# Patient Record
Sex: FEMALE | Race: BLACK OR AFRICAN AMERICAN | Employment: FULL TIME | URBAN - METROPOLITAN AREA
[De-identification: names, ages, dates, MRNs, and addresses within clinical notes are randomized per-mention and may not be internally consistent; named-entity substitution may affect disease eponyms.]

---

## 2017-03-29 ENCOUNTER — HOSPITAL ENCOUNTER (OUTPATIENT)
Dept: SURGERY | Age: 52
Discharge: HOME | End: 2017-03-29

## 2017-03-29 VITALS
BODY MASS INDEX: 27.93 KG/M2 | RESPIRATION RATE: 18 BRPM | HEART RATE: 57 BPM | WEIGHT: 195.06 LBS | DIASTOLIC BLOOD PRESSURE: 82 MMHG | TEMPERATURE: 97.9 F | OXYGEN SATURATION: 98 % | HEIGHT: 70 IN | SYSTOLIC BLOOD PRESSURE: 120 MMHG

## 2017-03-29 NOTE — PERIOP NOTES
Patient verified name, , and surgery as listed in [facility]. TYPE  CASE:1B  Orders per surgeon: not Received, call placed to office  Labs per surgeon:none  Labs per anesthesia protocol: none   EKG  :  Not needed at time of PAT      Patient provided with handouts including guide to surgery , transfusions, pain management and hand hygiene for the family and community. Pt verbalizes understanding of all pre-op instructions . Instructed that family must be present in building at all times. Hibiclens and instructions given per hospital policy. Instructed patient to continue  previous medications as prescribed prior to surgery and  to take the following medications the day of surgery according to anesthesia guidelines : singular, pantoprazole, bisoprolol       Original medication prescription bottles not visualized during patient appointment. Continue all previous medications unless otherwise directed. Instructed patient to hold  the following medications prior to surgery: Biotin, Vitamin D      Patient verbalized understanding of all instructions and provided all medical/health information to the best of their ability.

## 2017-04-01 ENCOUNTER — ANESTHESIA EVENT (OUTPATIENT)
Dept: SURGERY | Age: 52
End: 2017-04-01
Payer: COMMERCIAL

## 2017-04-02 NOTE — H&P
Loren Ward  History and physical    Subjective  Problem List:.   1. Right knee pain  2. Ganglion cyst in wrist.   3. Left wrist pain. 4. Left Sternoclavicular separation. 5. left shoulder pain     This right hand dominant patient presents today for evaluation of left shoulder pain. She complains of continued severe diffuse pain in her left shoulder. She states the cortisone injections she received on 15 were not effective. She presents today ambulatory without assistance and is unaccompanied. Known allergies: NKDA. Past Medical History (Major events, hospitalizations, surgeries):  [id number],  rt. ganglion cyst removal-, , hysterectomy-. Ongoing medical problems: cervical cancer, hypertension, allergies. Family medical history: cancer-grandparent, diabetes-grandparent, hypertension-grandparent. Social history: Patient denies tobacco and ETOH use. Patient is  and full time employed. Review of systems:   General: Denies weight change, generally healthy,  change in strength or exercise tolerance. Head: Denies headaches,  vertigo,  injury. Eyes: Denies changes in vision, denies diplopia,  tearing, scotomata,  pain. Prescription glasses. .   Ears:  Denies change in hearing,  tinnitus, bleeding, vertigo. Nose: Denies epistaxis,  coryza, obstruction,  discharge. Mouth: Denies dental difficulties,  gingival bleeding,  use of dentures. Neck: Denies stiffness and pain. Chest: Denies dyspnea, wheezing, hemoptysis, cough. Heart: Denies chest pains,  palpitations, syncope,  orthopnea. Abdomen: Denies change in appetite,  dysphagia, abdominal pains, bowel habit changes, emesis, melena. : Denies urinary urgency,  dysuria, change in nature of urine. Neurologic: Denies weakness, denies tremor,  seizures, changes in mentation,  ataxia. Psychiatric: Denies depressive symptoms, changes in sleep habits,  changes in thought content.        Objective  Vital Signs: Height 69 inches; Weight 194 lbs; /81 mmHg; Temp 97.7 F; Pulse 80 bpm; Oxygen Saturation 97 %. .     Patient is a 46year old female who appears her given age and is in no apparent distress. Oriented to person, place, and time. Mood and affect are appropriate for age and situation. Assessment of respiratory effort reveals even and nonlabored respirations. She exhibits a nonantalgic, reciprocal gait. She is able to get onto and off of the examination table. GEN: NAD. Gavi Ruggiero Lungs clear to auscultation bilaterally. Heart rate regular without murmur heard. .     Left Shoulder Examination:. Inspection reveals no external signs of acute injury or trauma over the left shoulder. There is a prominence noted to the left SC joint. She has pain to palpation over her AC joint. No warmth or erythema noted. Palpation reveals diffuse tenderness over the left shoulder - with focal tenderness over the SC joint and over the lateral subacromial aspect of the shoulder. Shoulder motion is limited today secondary to pain. At 90 degrees of FF and Abduction she has marked difficulty going past this point with NOAM<. She has full PROM. Forced Adduction of the shoulder causes discrete pain at the [facility] joint. Elbow motion is normal.   Wrist motion is normal.   Finger motion is normal.   Muscle strength is normal, without evidence of atrophy noted. Positive impingement test.   Neurologic: Sensation is intact and symmetrical in all dermatomes upper extremities. .   Vascular: Peripheral pulses normal 2/2 upper extremities. Gavi Ruggiero Upper extremity deep tendon reflexes are normal..   Coordination is good. .     Assessment  Shoulder pain (719.41).      DIAGNOSIS:        Pain in left shoulder [ICD-10: M25.512], [ICD-9: 719.41], [SNOMED: 908426285]        Other injury of tendons of the rotator cuff of left shoulder, subsequent encounter [ICD-10: S46.092D], [ICD-9: V58.89], [SNOMED: 861413047]        Subluxation of left sternoclavicular joint, sequela [ICD-10: S43.202S], [ICD-9: 905.6], [SNOMED: 072557820]        Impingement syndrome of left shoulder [ICD-10: M75.42], [ICD-9: 726.2], [SNOMED: 810387568]  Plan  We discussed the pathophysiology of the diagnosis and options for treatment. Discussed with patient the surgical risks. We have talked about the possibility of complications of surgical procedure (left shoulder arthroscopy with acromioplasty, chayito procedure and possible RCR), including the possibility of damage to nerves, arteries, vessels and tendons, bleeding, infection, the possibility that the patient may sustain medical problems, even death. We have talked about the possibility that the condition may not improve after surgery or that it could actually be worse. Patient seems to understand and accept these possible complications. .     All questions answered at this time. Patient knows to contact the office with any questions or concerns. .     VERIFICATION OF ANCILLARY DOCUMENTATION:  The portions of the chart completed by ancillary personnel were reviewed by the physician. Mayi Andrew RTC :  post-op.   TM  L4    MEDICATIONS:        Ziac 5 mg-6.25 mg oral tablet 1 PO BID for hypertension                    prescription:   not prescribed this visit        Singulair 10 mg oral tablet 1 PO qd for asthma                    prescription:   not prescribed this visit        Lasix 20 mg oral tablet 1 PO qd for hypertension                    prescription:   not prescribed this visit        Vitamin D3 43053 UNIT Oral Tablet once weekly                    prescription:   not prescribed this visit        Protonix 40 MG Oral Tablet Delayed Release 1 tablet (40 mg) orally daily                    prescription:   not prescribed this visit

## 2017-04-03 RX ORDER — SODIUM CHLORIDE 0.9 % (FLUSH) 0.9 %
5-10 SYRINGE (ML) INJECTION AS NEEDED
Status: CANCELLED | OUTPATIENT
Start: 2017-04-03

## 2017-04-03 RX ORDER — OXYCODONE AND ACETAMINOPHEN 5; 325 MG/1; MG/1
1 TABLET ORAL AS NEEDED
Status: CANCELLED | OUTPATIENT
Start: 2017-04-03

## 2017-04-03 RX ORDER — SODIUM CHLORIDE, SODIUM LACTATE, POTASSIUM CHLORIDE, CALCIUM CHLORIDE 600; 310; 30; 20 MG/100ML; MG/100ML; MG/100ML; MG/100ML
75 INJECTION, SOLUTION INTRAVENOUS CONTINUOUS
Status: CANCELLED | OUTPATIENT
Start: 2017-04-03

## 2017-04-03 RX ORDER — NALOXONE HYDROCHLORIDE 0.4 MG/ML
0.2 INJECTION, SOLUTION INTRAMUSCULAR; INTRAVENOUS; SUBCUTANEOUS AS NEEDED
Status: CANCELLED | OUTPATIENT
Start: 2017-04-03

## 2017-04-03 RX ORDER — HYDROMORPHONE HYDROCHLORIDE 2 MG/ML
0.5 INJECTION, SOLUTION INTRAMUSCULAR; INTRAVENOUS; SUBCUTANEOUS
Status: CANCELLED | OUTPATIENT
Start: 2017-04-03

## 2017-04-04 ENCOUNTER — HOSPITAL ENCOUNTER (OUTPATIENT)
Age: 52
Setting detail: OUTPATIENT SURGERY
Discharge: HOME | End: 2017-04-04
Attending: ORTHOPAEDIC SURGERY | Admitting: ORTHOPAEDIC SURGERY
Payer: COMMERCIAL

## 2017-04-04 ENCOUNTER — ANESTHESIA (OUTPATIENT)
Dept: SURGERY | Age: 52
End: 2017-04-04
Payer: COMMERCIAL

## 2017-04-04 VITALS
OXYGEN SATURATION: 97 % | DIASTOLIC BLOOD PRESSURE: 93 MMHG | WEIGHT: 194.13 LBS | BODY MASS INDEX: 27.85 KG/M2 | HEART RATE: 60 BPM | TEMPERATURE: 97.8 F | SYSTOLIC BLOOD PRESSURE: 140 MMHG | RESPIRATION RATE: 16 BRPM

## 2017-04-04 LAB — POTASSIUM BLD-SCNC: 4 MMOL/L (ref 3.5–5.1)

## 2017-04-04 PROCEDURE — 77030002982 HC SUT POLYSRB J&J -A: Performed by: ORTHOPAEDIC SURGERY

## 2017-04-04 PROCEDURE — 77030002933 HC SUT MCRYL J&J -A: Performed by: ORTHOPAEDIC SURGERY

## 2017-04-04 PROCEDURE — 76942 ECHO GUIDE FOR BIOPSY: CPT | Performed by: ORTHOPAEDIC SURGERY

## 2017-04-04 PROCEDURE — 84132 ASSAY OF SERUM POTASSIUM: CPT

## 2017-04-04 PROCEDURE — 76010010054 HC POST OP PAIN BLOCK: Performed by: ORTHOPAEDIC SURGERY

## 2017-04-04 PROCEDURE — 76210000021 HC REC RM PH II 0.5 TO 1 HR: Performed by: ORTHOPAEDIC SURGERY

## 2017-04-04 PROCEDURE — 77030018836 HC SOL IRR NACL ICUM -A: Performed by: ORTHOPAEDIC SURGERY

## 2017-04-04 PROCEDURE — 76210000063 HC OR PH I REC FIRST 0.5 HR: Performed by: ORTHOPAEDIC SURGERY

## 2017-04-04 PROCEDURE — 74011250636 HC RX REV CODE- 250/636: Performed by: ORTHOPAEDIC SURGERY

## 2017-04-04 PROCEDURE — 77030008467 HC STPLR SKN COVD -B: Performed by: ORTHOPAEDIC SURGERY

## 2017-04-04 PROCEDURE — 76060000034 HC ANESTHESIA 1.5 TO 2 HR: Performed by: ORTHOPAEDIC SURGERY

## 2017-04-04 PROCEDURE — 77030032490 HC SLV COMPR SCD KNE COVD -B: Performed by: ORTHOPAEDIC SURGERY

## 2017-04-04 PROCEDURE — 77030006891 HC BLD SHV RESECT STRY -B: Performed by: ORTHOPAEDIC SURGERY

## 2017-04-04 PROCEDURE — 77030002916 HC SUT ETHLN J&J -A: Performed by: ORTHOPAEDIC SURGERY

## 2017-04-04 PROCEDURE — 77030019940 HC BLNKT HYPOTHRM STRY -B: Performed by: ANESTHESIOLOGY

## 2017-04-04 PROCEDURE — 77030019605: Performed by: ORTHOPAEDIC SURGERY

## 2017-04-04 PROCEDURE — 74011250636 HC RX REV CODE- 250/636: Performed by: ANESTHESIOLOGY

## 2017-04-04 PROCEDURE — A4565 SLINGS: HCPCS | Performed by: ORTHOPAEDIC SURGERY

## 2017-04-04 PROCEDURE — 77030004453 HC BUR SHV STRY -B: Performed by: ORTHOPAEDIC SURGERY

## 2017-04-04 PROCEDURE — 77030010507 HC ADH SKN DERMBND J&J -B: Performed by: ORTHOPAEDIC SURGERY

## 2017-04-04 PROCEDURE — 77030031139 HC SUT VCRL2 J&J -A: Performed by: ORTHOPAEDIC SURGERY

## 2017-04-04 PROCEDURE — 74011250636 HC RX REV CODE- 250/636

## 2017-04-04 PROCEDURE — 74011000250 HC RX REV CODE- 250

## 2017-04-04 PROCEDURE — 77030027384 HC PRB ARTHSCP SERFAS STRY -C: Performed by: ORTHOPAEDIC SURGERY

## 2017-04-04 PROCEDURE — 76010000162 HC OR TIME 1.5 TO 2 HR INTENSV-TIER 1: Performed by: ORTHOPAEDIC SURGERY

## 2017-04-04 PROCEDURE — 77030006788 HC BLD SAW OSC STRY -B: Performed by: ORTHOPAEDIC SURGERY

## 2017-04-04 PROCEDURE — 77030003666 HC NDL SPINAL BD -A: Performed by: ORTHOPAEDIC SURGERY

## 2017-04-04 PROCEDURE — 77030011640 HC PAD GRND REM COVD -A: Performed by: ORTHOPAEDIC SURGERY

## 2017-04-04 PROCEDURE — 77030003602 HC NDL NRV BLK BBMI -B: Performed by: ANESTHESIOLOGY

## 2017-04-04 PROCEDURE — 77030020143 HC AIRWY LARYN INTUB CGAS -A: Performed by: ANESTHESIOLOGY

## 2017-04-04 RX ORDER — FAMOTIDINE 20 MG/1
20 TABLET, FILM COATED ORAL ONCE
Status: DISCONTINUED | OUTPATIENT
Start: 2017-04-04 | End: 2017-04-04 | Stop reason: HOSPADM

## 2017-04-04 RX ORDER — SODIUM CHLORIDE, SODIUM LACTATE, POTASSIUM CHLORIDE, CALCIUM CHLORIDE 600; 310; 30; 20 MG/100ML; MG/100ML; MG/100ML; MG/100ML
75 INJECTION, SOLUTION INTRAVENOUS CONTINUOUS
Status: DISCONTINUED | OUTPATIENT
Start: 2017-04-04 | End: 2017-04-04 | Stop reason: HOSPADM

## 2017-04-04 RX ORDER — PROPOFOL 10 MG/ML
INJECTION, EMULSION INTRAVENOUS AS NEEDED
Status: DISCONTINUED | OUTPATIENT
Start: 2017-04-04 | End: 2017-04-04 | Stop reason: HOSPADM

## 2017-04-04 RX ORDER — FENTANYL CITRATE 50 UG/ML
INJECTION, SOLUTION INTRAMUSCULAR; INTRAVENOUS AS NEEDED
Status: DISCONTINUED | OUTPATIENT
Start: 2017-04-04 | End: 2017-04-04 | Stop reason: HOSPADM

## 2017-04-04 RX ORDER — KETOROLAC TROMETHAMINE 30 MG/ML
INJECTION, SOLUTION INTRAMUSCULAR; INTRAVENOUS AS NEEDED
Status: DISCONTINUED | OUTPATIENT
Start: 2017-04-04 | End: 2017-04-04 | Stop reason: HOSPADM

## 2017-04-04 RX ORDER — EPINEPHRINE 1 MG/ML
INJECTION, SOLUTION, CONCENTRATE INTRAVENOUS AS NEEDED
Status: DISCONTINUED | OUTPATIENT
Start: 2017-04-04 | End: 2017-04-04 | Stop reason: HOSPADM

## 2017-04-04 RX ORDER — MIDAZOLAM HYDROCHLORIDE 1 MG/ML
2 INJECTION, SOLUTION INTRAMUSCULAR; INTRAVENOUS
Status: DISCONTINUED | OUTPATIENT
Start: 2017-04-04 | End: 2017-04-04 | Stop reason: HOSPADM

## 2017-04-04 RX ORDER — LIDOCAINE HYDROCHLORIDE 10 MG/ML
0.1 INJECTION INFILTRATION; PERINEURAL AS NEEDED
Status: DISCONTINUED | OUTPATIENT
Start: 2017-04-04 | End: 2017-04-04 | Stop reason: HOSPADM

## 2017-04-04 RX ORDER — ONDANSETRON 2 MG/ML
INJECTION INTRAMUSCULAR; INTRAVENOUS AS NEEDED
Status: DISCONTINUED | OUTPATIENT
Start: 2017-04-04 | End: 2017-04-04 | Stop reason: HOSPADM

## 2017-04-04 RX ORDER — DEXAMETHASONE SODIUM PHOSPHATE 4 MG/ML
INJECTION, SOLUTION INTRA-ARTICULAR; INTRALESIONAL; INTRAMUSCULAR; INTRAVENOUS; SOFT TISSUE AS NEEDED
Status: DISCONTINUED | OUTPATIENT
Start: 2017-04-04 | End: 2017-04-04 | Stop reason: HOSPADM

## 2017-04-04 RX ORDER — CEFAZOLIN SODIUM IN 0.9 % NACL 2 G/50 ML
2 INTRAVENOUS SOLUTION, PIGGYBACK (ML) INTRAVENOUS ONCE
Status: DISCONTINUED | OUTPATIENT
Start: 2017-04-04 | End: 2017-04-04 | Stop reason: HOSPADM

## 2017-04-04 RX ORDER — CEFAZOLIN SODIUM IN 0.9 % NACL 2 G/50 ML
INTRAVENOUS SOLUTION, PIGGYBACK (ML) INTRAVENOUS AS NEEDED
Status: DISCONTINUED | OUTPATIENT
Start: 2017-04-04 | End: 2017-04-04 | Stop reason: HOSPADM

## 2017-04-04 RX ORDER — GLYCOPYRROLATE 0.2 MG/ML
INJECTION INTRAMUSCULAR; INTRAVENOUS AS NEEDED
Status: DISCONTINUED | OUTPATIENT
Start: 2017-04-04 | End: 2017-04-04 | Stop reason: HOSPADM

## 2017-04-04 RX ORDER — LIDOCAINE HYDROCHLORIDE 20 MG/ML
INJECTION, SOLUTION EPIDURAL; INFILTRATION; INTRACAUDAL; PERINEURAL AS NEEDED
Status: DISCONTINUED | OUTPATIENT
Start: 2017-04-04 | End: 2017-04-04 | Stop reason: HOSPADM

## 2017-04-04 RX ADMIN — Medication 2 G: at 10:22

## 2017-04-04 RX ADMIN — KETOROLAC TROMETHAMINE 30 MG: 30 INJECTION, SOLUTION INTRAMUSCULAR; INTRAVENOUS at 11:37

## 2017-04-04 RX ADMIN — ONDANSETRON 4 MG: 2 INJECTION INTRAMUSCULAR; INTRAVENOUS at 11:37

## 2017-04-04 RX ADMIN — PROPOFOL 200 MG: 10 INJECTION, EMULSION INTRAVENOUS at 10:27

## 2017-04-04 RX ADMIN — MIDAZOLAM HYDROCHLORIDE 2 MG: 1 INJECTION, SOLUTION INTRAMUSCULAR; INTRAVENOUS at 07:51

## 2017-04-04 RX ADMIN — FENTANYL CITRATE 25 MCG: 50 INJECTION, SOLUTION INTRAMUSCULAR; INTRAVENOUS at 10:42

## 2017-04-04 RX ADMIN — LIDOCAINE HYDROCHLORIDE 80 MG: 20 INJECTION, SOLUTION EPIDURAL; INFILTRATION; INTRACAUDAL; PERINEURAL at 10:27

## 2017-04-04 RX ADMIN — GLYCOPYRROLATE 0.2 MG: 0.2 INJECTION INTRAMUSCULAR; INTRAVENOUS at 10:53

## 2017-04-04 RX ADMIN — SODIUM CHLORIDE, SODIUM LACTATE, POTASSIUM CHLORIDE, AND CALCIUM CHLORIDE 75 ML/HR: 600; 310; 30; 20 INJECTION, SOLUTION INTRAVENOUS at 07:30

## 2017-04-04 RX ADMIN — DEXAMETHASONE SODIUM PHOSPHATE 4 MG: 4 INJECTION, SOLUTION INTRA-ARTICULAR; INTRALESIONAL; INTRAMUSCULAR; INTRAVENOUS; SOFT TISSUE at 10:55

## 2017-04-04 NOTE — ANESTHESIA PREPROCEDURE EVALUATION
Anesthetic History   No history of anesthetic complications            Review of Systems / Medical History  Patient summary reviewed, nursing notes reviewed and pertinent labs reviewed    Pulmonary  Within defined limits                 Neuro/Psych   Within defined limits           Cardiovascular    Hypertension              Exercise tolerance: >4 METS     GI/Hepatic/Renal                Endo/Other        Arthritis and cancer     Other Findings   Comments: H/O cervical cancer         Physical Exam    Airway  Mallampati: II  TM Distance: 4 - 6 cm  Neck ROM: normal range of motion   Mouth opening: Normal     Cardiovascular    Rhythm: regular           Dental  No notable dental hx       Pulmonary  Breath sounds clear to auscultation               Abdominal  GI exam deferred       Other Findings            Anesthetic Plan    ASA: 2  Anesthesia type: general      Post-op pain plan if not by surgeon: peripheral nerve block single    Induction: Intravenous  Anesthetic plan and risks discussed with: Patient

## 2017-04-04 NOTE — H&P
History and Physical Updated with no interval change. Eneida Yang MD History and Physical Updated with no interval change.  Eneida Yang MD

## 2017-04-04 NOTE — ADDENDUM NOTE
Addendum  created 04/04/17 1241 by Dana Roland CRNA    Anesthesia Intra Meds edited, Orders acknowledged in Narrator

## 2017-04-04 NOTE — IP AVS SNAPSHOT
[address] 
[phone] Patient: Vinicio Moran MRN: [id number] :1965 You are allergic to the following No active allergies Recent Documentation Weight BMI OB Status Smoking Status 88.1 kg 27.85 kg/m2 Hysterectomy Former Smoker Emergency Contacts Name Discharge Info Relation Home Work Mobile [address] [facility]  Spouse [3] [phone] Pranav Quiles DISCHARGE CAREGIVER [3] Son [22] [phone] About your hospitalization You were admitted on:  2017 You last received care in the:  [facility] OP PACU You were discharged on:  2017 Unit phone number:  [phone] Why you were hospitalized Your primary diagnosis was:  Not on File Providers Seen During Your Hospitalizations Provider Role Specialty Primary office phone Joi Dias MD Attending Provider Orthopedic Surgery [phone] Your Primary Care Physician (PCP) Primary Care Physician Office Phone Office Fax Ethyl Randall [phone] Follow-up Information Follow up With Details Comments Contact Info Emre Oneill MD   [facility] Internal Medicine [address] [place] [address] 
[phone] Your Appointments Friday May 05, 2017  3:45 PM EDT Follow Up with Emre Oneill MD  
[place] [facility] ([facility]) [address] [facility]  
[phone] Current Discharge Medication List  
  
CONTINUE these medications which have NOT CHANGED Dose & Instructions Dispensing Information Comments Morning Noon Evening Bedtime Biotin 2,500 mcg Cap Your last dose was: Your next dose is:    
   
   
 Dose:  18430 mcg Take 10,000 mcg by mouth. Refills:  0 bisoprolol 5 mg tablet Commonly known as:  Charlsie Going Your last dose was: Your next dose is:    
   
   
 Dose:  5 mg Take 1 Tab by mouth every morning. Quantity:  90 Tab Refills:  4  
     
   
   
   
  
 ergocalciferol 50,000 unit capsule Commonly known as:  VITAMIN D2 Your last dose was: Your next dose is: TAKE ONE CAPSULE BY MOUTH ONCE A WEEK Quantity:  12 Cap Refills:  1  
     
   
   
   
  
 furosemide 40 mg tablet Commonly known as:  LASIX Your last dose was: Your next dose is:    
   
   
 Dose:  40 mg Take 1 Tab by mouth daily. Quantity:  90 Tab Refills:  3  
     
   
   
   
  
 montelukast 10 mg tablet Commonly known as:  SINGULAIR Your last dose was: Your next dose is: TAKE 1 TABLET BY MOUTH EVERY DAY Quantity:  90 Tab Refills:  3  
     
   
   
   
  
 pantoprazole 40 mg tablet Commonly known as:  PROTONIX Your last dose was: Your next dose is:    
   
   
 Dose:  40 mg Take 1 Tab by mouth daily. Quantity:  30 Tab Refills:  3  
     
   
   
   
  
 polyethylene glycol 17 gram/dose powder Commonly known as:  Evelin Russell Your last dose was: Your next dose is: TAKE 17 GM BY MOUTH DAILY MIXED IN 8 OZ OF WATER Quantity:  527 g Refills:  2 Discharge Instructions May use (range of motion) left shoulder as tolerated. Ice to shoulder overnight. Call me if any problems. Maintain dressing. ACTIVITY · As tolerated and as directed by your doctor. · Bathe or shower as directed by your doctor. DIET · Clear liquids until no nausea or vomiting; then light diet for the first day. · Advance to regular diet on second day, unless your doctor orders otherwise. · If nausea and vomiting continues, call your doctor. PAIN 
· Take pain medication as directed by your doctor. · Call your doctor if pain is NOT relieved by medication. · DO NOT take aspirin of blood thinners unless directed by your doctor. DRESSING CARE  
 
 
 
After general anesthesia or intravenous sedation, for 24 hours or while taking prescription Narcotics: · Limit your activities · Do not drive and operate hazardous machinery · Do not make important personal or business decisions · Do  not drink alcoholic beverages · If you have not urinated within 8 hours after discharge, please contact your surgeon on call. *  Please give a list of your current medications to your Primary Care Provider. *  Please update this list whenever your medications are discontinued, doses are 
    changed, or new medications (including over-the-counter products) are added. *  Please carry medication information at all times in case of emergency situations. These are general instructions for a healthy lifestyle: No smoking/ No tobacco products/ Avoid exposure to second hand smoke Surgeon General's Warning:  Quitting smoking now greatly reduces serious risk to your health. Obesity, smoking, and sedentary lifestyle greatly increases your risk for illness A healthy diet, regular physical exercise & weight monitoring are important for maintaining a healthy lifestyle You may be retaining fluid if you have a history of heart failure or if you experience any of the following symptoms:  Weight gain of 3 pounds or more overnight or 5 pounds in a week, increased swelling in our hands or feet or shortness of breath while lying flat in bed. Please call your doctor as soon as you notice any of these symptoms; do not wait until your next office visit. Recognize signs and symptoms of STROKE: 
 
F-face looks uneven A-arms unable to move or move unevenly S-speech slurred or non-existent T-time-call 911 as soon as signs and symptoms begin-DO NOT go Back to bed or wait to see if you get better-TIME IS BRAIN. Discharge Orders None [facility]! Dear Clare Mccall: Thank you for requesting a [employer] account. Our records indicate that you already have an active Dextr account. You can access your account anytime at [email or web address] Did you know that you can access your hospital and ER discharge instructions at any time in De[employer]r? You can also review all of your test results from your hospital stay or ER visit. Additional Information If you have questions, please visit the Frequently Asked Questions section of the Dextr website at [email or web address] Remember, Dextr is NOT to be used for urgent needs. For medical emergencies, dial 911. Now available from your iPhone and Android! General Information Please provide this summary of care documentation to your next provider. Patient Signature:  ____________________________________________________________ Date:  ____________________________________________________________  
  
Zeferino Sarah Provider Signature:  ____________________________________________________________ Date:  ____________________________________________________________

## 2017-04-04 NOTE — ANESTHESIA POSTPROCEDURE EVALUATION
Post-Anesthesia Evaluation and Assessment    Patient: Calderon Alfred MRN: [id number]  SSN: [id number]    YOB: 1965  Age: 46 y.o. Sex: female       Cardiovascular Function/Vital Signs  Visit Vitals    /80    Pulse (!) 57    Temp 36.6 °C (97.8 °F)    Resp 14    Wt 88.1 kg (194 lb 2 oz)    SpO2 97%    BMI 27.85 kg/m2       Patient is status post general anesthesia for Procedure(s):  LEFT SHOULDER ARTHROSCOPY/DAREK PROCEDURE/SUBACROMIAL DECOMPRESSION. Nausea/Vomiting: None    Postoperative hydration reviewed and adequate. Pain:  Pain Scale 1: Numeric (0 - 10) (04/04/17 1216)  Pain Intensity 1: 0 (04/04/17 1216)   Managed    Neurological Status:   Neuro (WDL): Within Defined Limits (04/04/17 1216)  Neuro  Neurologic State: Drowsy (04/04/17 1159)  Orientation Level: Oriented X4 (04/04/17 1159)  LUE Motor Response: Pharmacologically paralyzed (04/04/17 1159)  LLE Motor Response: Purposeful (04/04/17 1159)  RUE Motor Response: Purposeful (04/04/17 1159)  RLE Motor Response: Purposeful (04/04/17 1159)   At baseline    Mental Status and Level of Consciousness: Arousable    Pulmonary Status:   O2 Device: Room air (04/04/17 1216)   Adequate oxygenation and airway patent    Complications related to anesthesia: None    Post-anesthesia assessment completed.  No concerns    Signed By: Eliza De Los Santos MD     April 4, 2017

## 2017-04-04 NOTE — IP AVS SNAPSHOT
Current Discharge Medication List  
  
CONTINUE these medications which have NOT CHANGED Dose & Instructions Dispensing Information Comments Morning Noon Evening Bedtime Biotin 2,500 mcg Cap Your last dose was: Your next dose is:    
   
   
 Dose:  04103 mcg Take 10,000 mcg by mouth. Refills:  0  
     
   
   
   
  
 bisoprolol 5 mg tablet Commonly known as:  Sherman Crow Your last dose was: Your next dose is:    
   
   
 Dose:  5 mg Take 1 Tab by mouth every morning. Quantity:  90 Tab Refills:  4  
     
   
   
   
  
 ergocalciferol 50,000 unit capsule Commonly known as:  VITAMIN D2 Your last dose was: Your next dose is: TAKE ONE CAPSULE BY MOUTH ONCE A WEEK Quantity:  12 Cap Refills:  1  
     
   
   
   
  
 furosemide 40 mg tablet Commonly known as:  LASIX Your last dose was: Your next dose is:    
   
   
 Dose:  40 mg Take 1 Tab by mouth daily. Quantity:  90 Tab Refills:  3  
     
   
   
   
  
 montelukast 10 mg tablet Commonly known as:  SINGULAIR Your last dose was: Your next dose is: TAKE 1 TABLET BY MOUTH EVERY DAY Quantity:  90 Tab Refills:  3  
     
   
   
   
  
 pantoprazole 40 mg tablet Commonly known as:  PROTONIX Your last dose was: Your next dose is:    
   
   
 Dose:  40 mg Take 1 Tab by mouth daily. Quantity:  30 Tab Refills:  3  
     
   
   
   
  
 polyethylene glycol 17 gram/dose powder Commonly known as:  Baltazar  Your last dose was: Your next dose is: TAKE 17 GM BY MOUTH DAILY MIXED IN 8 OZ OF WATER Quantity:  527 g Refills:  2

## 2017-04-04 NOTE — PERIOP NOTES
Report received from [address] [place] [address] Providing lunch relief. Pt without c/o, HOB elevated to 70 degrees, o2 removed and given sips of water.

## 2017-04-04 NOTE — DISCHARGE INSTRUCTIONS
May use (range of motion) left shoulder as tolerated. Ice to shoulder overnight. Call me if any problems. Maintain dressing. ACTIVITY  · As tolerated and as directed by your doctor. · Bathe or shower as directed by your doctor. DIET  · Clear liquids until no nausea or vomiting; then light diet for the first day. · Advance to regular diet on second day, unless your doctor orders otherwise. · If nausea and vomiting continues, call your doctor. PAIN  · Take pain medication as directed by your doctor. · Call your doctor if pain is NOT relieved by medication. · DO NOT take aspirin of blood thinners unless directed by your doctor. DRESSING CARE       CALL YOUR DOCTOR IF   · Excessive bleeding that does not stop after holding pressure over the area  · Temperature of 101 degrees F or above  · Excessive redness, swelling or bruising, and/ or green or yellow, smelly discharge from incision    AFTER ANESTHESIA   · For the first 24 hours: DO NOT Drive, Drink alcoholic beverages, or Make important decisions. · Be aware of dizziness following anesthesia and while taking pain medication. DISCHARGE SUMMARY from Nurse    PATIENT INSTRUCTIONS:    After general anesthesia or intravenous sedation, for 24 hours or while taking prescription Narcotics:  · Limit your activities  · Do not drive and operate hazardous machinery  · Do not make important personal or business decisions  · Do  not drink alcoholic beverages  · If you have not urinated within 8 hours after discharge, please contact your surgeon on call. *  Please give a list of your current medications to your Primary Care Provider. *  Please update this list whenever your medications are discontinued, doses are      changed, or new medications (including over-the-counter products) are added. *  Please carry medication information at all times in case of emergency situations.       These are general instructions for a healthy lifestyle:    No smoking/ No tobacco products/ Avoid exposure to second hand smoke    Surgeon General's Warning:  Quitting smoking now greatly reduces serious risk to your health. Obesity, smoking, and sedentary lifestyle greatly increases your risk for illness    A healthy diet, regular physical exercise & weight monitoring are important for maintaining a healthy lifestyle    You may be retaining fluid if you have a history of heart failure or if you experience any of the following symptoms:  Weight gain of 3 pounds or more overnight or 5 pounds in a week, increased swelling in our hands or feet or shortness of breath while lying flat in bed. Please call your doctor as soon as you notice any of these symptoms; do not wait until your next office visit. Recognize signs and symptoms of STROKE:    F-face looks uneven    A-arms unable to move or move unevenly    S-speech slurred or non-existent    T-time-call 911 as soon as signs and symptoms begin-DO NOT go       Back to bed or wait to see if you get better-TIME IS BRAIN.

## 2017-04-04 NOTE — BRIEF OP NOTE
BRIEF OPERATIVE NOTE    Date of Procedure: 4/4/2017   Preoperative Diagnosis: Other injury of muscle(s) and tendon(s) of the rotator cuff of left shoulder, subsequent encounter [S46.092D]  Left shoulder pain, unspecified chronicity [M25.512]  Postoperative Diagnosis: injury of muscle(s) and tendon(s) of the rotator cuff of left shoulder, subsequent encounter [S46.092D]  Left shoulder pain, unspecified chronicity [M25.512]    Procedure(s):  LEFT SHOULDER ARTHROSCOPY/DAREK PROCEDURE/SUBACROMIAL DECOMPRESSION  Surgeon(s) and Role:     * Lucille Chacon MD - Primary            Surgical Staff:  Circ-1: Ara Talavera RN  Circ-Relief: Derek Peng RN  Scrub Tech-1: Myles Drake  Scrub Tech-2: George Heart  Event Time In   Incision Start 1049   Incision Close 1138     Anesthesia: General   Estimated Blood Loss: minimal  Specimens: * No specimens in log *   Findings:  As above   Complications: none noted  Implants: * No implants in log *

## 2017-04-04 NOTE — ANESTHESIA PROCEDURE NOTES
Peripheral Block    Start time: 4/4/2017 7:51 AM  End time: 4/4/2017 7:55 AM  Performed by: Magdalena Kenney  Authorized by: Magdalena Kenney       Pre-procedure: Indications: post-op pain management    Preanesthetic Checklist: patient identified, risks and benefits discussed, site marked, timeout performed, anesthesia consent given and patient being monitored    Timeout Time: 07:51          Block Type:   Block Type:   Interscalene  Laterality:  Left  Monitoring:  Standard ASA monitoring, responsive to questions, oxygen, continuous pulse ox, frequent vital sign checks and heart rate  Injection Technique:  Single shot  Procedures: ultrasound guided    Patient Position: seated  Prep: chlorhexidine    Location:  Interscalene  Needle Type:  Stimuplex  Needle Gauge:  22 G  Needle Localization:  Ultrasound guidance and nerve stimulator  Motor Response: minimal motor response >0.4 mA    Medication Injected:  0.375%  bupivacaine  Adds:  Epi 1:400K  Volume (mL):  25  mepivacaine  Volume (mL):  10    Assessment:  Number of attempts:  1  Injection Assessment:  Incremental injection every 5 mL, negative aspiration for CSF, no paresthesia, ultrasound image on chart, no intravascular symptoms, negative aspiration for blood and local visualized surrounding nerve on ultrasound  Patient tolerance:  Patient tolerated the procedure well with no immediate complications
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
## 2017-10-11 ENCOUNTER — HOSPITAL ENCOUNTER (OUTPATIENT)
Dept: MRI IMAGING | Age: 52
Discharge: HOME | End: 2017-10-11
Attending: NEUROLOGICAL SURGERY
Payer: COMMERCIAL

## 2017-10-11 DIAGNOSIS — M54.2 NECK PAIN: ICD-10-CM

## 2017-10-11 PROCEDURE — 72141 MRI NECK SPINE W/O DYE: CPT

## 2017-10-31 PROBLEM — M25.511 CHRONIC RIGHT SHOULDER PAIN: Status: ACTIVE | Noted: 2017-10-31

## 2017-10-31 PROBLEM — M50.30 DDD (DEGENERATIVE DISC DISEASE), CERVICAL: Status: ACTIVE | Noted: 2017-10-31

## 2017-10-31 PROBLEM — G89.29 CHRONIC RIGHT SHOULDER PAIN: Status: ACTIVE | Noted: 2017-10-31

## 2017-11-14 ENCOUNTER — HOSPITAL ENCOUNTER (OUTPATIENT)
Dept: MRI IMAGING | Age: 52
Discharge: HOME | End: 2017-11-14
Attending: NEUROLOGICAL SURGERY
Payer: COMMERCIAL

## 2017-11-14 DIAGNOSIS — M25.511 CHRONIC RIGHT SHOULDER PAIN: ICD-10-CM

## 2017-11-14 DIAGNOSIS — G89.29 CHRONIC RIGHT SHOULDER PAIN: ICD-10-CM

## 2017-11-14 DIAGNOSIS — M50.30 DDD (DEGENERATIVE DISC DISEASE), CERVICAL: ICD-10-CM

## 2017-11-14 PROCEDURE — 73221 MRI JOINT UPR EXTREM W/O DYE: CPT

## 2017-12-09 ENCOUNTER — HOSPITAL ENCOUNTER (OUTPATIENT)
Dept: MAMMOGRAPHY | Age: 52
Discharge: HOME | End: 2017-12-09
Attending: INTERNAL MEDICINE
Payer: COMMERCIAL

## 2017-12-09 DIAGNOSIS — Z12.39 BREAST CANCER SCREENING: ICD-10-CM

## 2017-12-09 PROCEDURE — 77067 SCR MAMMO BI INCL CAD: CPT

## 2017-12-19 ENCOUNTER — HOSPITAL ENCOUNTER (OUTPATIENT)
Dept: SURGERY | Age: 52
Discharge: HOME | End: 2017-12-19
Payer: COMMERCIAL

## 2017-12-19 VITALS
HEIGHT: 70 IN | DIASTOLIC BLOOD PRESSURE: 89 MMHG | BODY MASS INDEX: 29.51 KG/M2 | OXYGEN SATURATION: 100 % | WEIGHT: 206.13 LBS | SYSTOLIC BLOOD PRESSURE: 166 MMHG | RESPIRATION RATE: 16 BRPM | TEMPERATURE: 97.5 F | HEART RATE: 68 BPM

## 2017-12-19 LAB
HGB BLD-MCNC: 12.9 G/DL (ref 11.7–15.4)
POTASSIUM SERPL-SCNC: 3.8 MMOL/L (ref 3.5–5.1)

## 2017-12-19 PROCEDURE — 85018 HEMOGLOBIN: CPT | Performed by: ANESTHESIOLOGY

## 2017-12-19 PROCEDURE — 84132 ASSAY OF SERUM POTASSIUM: CPT | Performed by: ANESTHESIOLOGY

## 2017-12-19 RX ORDER — MELOXICAM 15 MG/1
15 TABLET ORAL
Refills: 3 | COMMUNITY
Start: 2017-11-13 | End: 2018-01-31 | Stop reason: SDUPTHER

## 2017-12-19 RX ORDER — TRAMADOL HYDROCHLORIDE 50 MG/1
50 TABLET ORAL EVERY 6 HOURS
Refills: 0 | COMMUNITY
Start: 2017-11-26 | End: 2019-01-09

## 2017-12-19 NOTE — PERIOP NOTES
Patient verified name, , and surgery as listed in [facility]. Patient provided medical/health information and PTA medications to the best of their ability. TYPE  CASE:1B  Orders per surgeon: Received none and dated no orders. Labs per surgeon:no orders. Labs per anesthesia protocol: Hgb, K+. Results pending  EKG  :  Not needed  EKG 10-4-16 on chart WNL     Patient provided with and instructed on education handouts including Guide to Surgery, blood transfusions, pain management, and hand hygiene for the family and community, and [facility] brochure. One bar pascual mist soap and instructions given per hospital policy. Instructed patient to continue previous medications as prescribed prior to surgery unless otherwise directed and to take the following medications the day of surgery according to anesthesia guidelines : bisoprolol, pantoprozole . Instructed patient to hold  the following medications: biotin, vit D, meloxicam .    Original medication prescription bottles NOT visualized during patient appointment. Patient teach back successful and patient demonstrates knowledge of instruction.

## 2017-12-19 NOTE — PERIOP NOTES
Recent Results (from the past 12 hour(s))   POTASSIUM    Collection Time: 12/19/17  3:59 PM   Result Value Ref Range    Potassium 3.8 3.5 - 5.1 mmol/L   HEMOGLOBIN    Collection Time: 12/19/17  3:59 PM   Result Value Ref Range    HGB 12.9 11.7 - 15.4 g/dL     Reviewed

## 2017-12-22 NOTE — PERIOP NOTES
Labs from Dr Samir Ballesteros office reviewed and placed on chart, acceptable to anesthesia protocol.

## 2017-12-25 ENCOUNTER — ANESTHESIA EVENT (OUTPATIENT)
Dept: SURGERY | Age: 52
End: 2017-12-25
Payer: COMMERCIAL

## 2017-12-26 ENCOUNTER — ANESTHESIA (OUTPATIENT)
Dept: SURGERY | Age: 52
End: 2017-12-26
Payer: COMMERCIAL

## 2017-12-26 ENCOUNTER — HOSPITAL ENCOUNTER (OUTPATIENT)
Age: 52
Setting detail: OUTPATIENT SURGERY
Discharge: HOME | End: 2017-12-26
Attending: ORTHOPAEDIC SURGERY | Admitting: ORTHOPAEDIC SURGERY
Payer: COMMERCIAL

## 2017-12-26 VITALS
BODY MASS INDEX: 29.78 KG/M2 | DIASTOLIC BLOOD PRESSURE: 87 MMHG | TEMPERATURE: 97.6 F | HEIGHT: 70 IN | HEART RATE: 71 BPM | OXYGEN SATURATION: 98 % | WEIGHT: 208 LBS | RESPIRATION RATE: 16 BRPM | SYSTOLIC BLOOD PRESSURE: 165 MMHG

## 2017-12-26 PROCEDURE — 74011250636 HC RX REV CODE- 250/636: Performed by: ANESTHESIOLOGY

## 2017-12-26 PROCEDURE — 77030006788 HC BLD SAW OSC STRY -B: Performed by: ORTHOPAEDIC SURGERY

## 2017-12-26 PROCEDURE — 77030032490 HC SLV COMPR SCD KNE COVD -B: Performed by: ORTHOPAEDIC SURGERY

## 2017-12-26 PROCEDURE — 77030003666 HC NDL SPINAL BD -A: Performed by: ORTHOPAEDIC SURGERY

## 2017-12-26 PROCEDURE — 77030010507 HC ADH SKN DERMBND J&J -B: Performed by: ORTHOPAEDIC SURGERY

## 2017-12-26 PROCEDURE — 76010010054 HC POST OP PAIN BLOCK: Performed by: ORTHOPAEDIC SURGERY

## 2017-12-26 PROCEDURE — 77030002982 HC SUT POLYSRB J&J -A: Performed by: ORTHOPAEDIC SURGERY

## 2017-12-26 PROCEDURE — 77030020143 HC AIRWY LARYN INTUB CGAS -A: Performed by: NURSE ANESTHETIST, CERTIFIED REGISTERED

## 2017-12-26 PROCEDURE — 74011000250 HC RX REV CODE- 250

## 2017-12-26 PROCEDURE — 77030008467 HC STPLR SKN COVD -B: Performed by: ORTHOPAEDIC SURGERY

## 2017-12-26 PROCEDURE — 77030019605: Performed by: ORTHOPAEDIC SURGERY

## 2017-12-26 PROCEDURE — 77030019940 HC BLNKT HYPOTHRM STRY -B: Performed by: NURSE ANESTHETIST, CERTIFIED REGISTERED

## 2017-12-26 PROCEDURE — 74011250636 HC RX REV CODE- 250/636: Performed by: ORTHOPAEDIC SURGERY

## 2017-12-26 PROCEDURE — 77030036560 HC SHLDR ARM PAD ABDUCTN S2SG -B: Performed by: ORTHOPAEDIC SURGERY

## 2017-12-26 PROCEDURE — 76010000162 HC OR TIME 1.5 TO 2 HR INTENSV-TIER 1: Performed by: ORTHOPAEDIC SURGERY

## 2017-12-26 PROCEDURE — 77030027384 HC PRB ARTHSCP SERFAS STRY -C: Performed by: ORTHOPAEDIC SURGERY

## 2017-12-26 PROCEDURE — C1713 ANCHOR/SCREW BN/BN,TIS/BN: HCPCS | Performed by: ORTHOPAEDIC SURGERY

## 2017-12-26 PROCEDURE — 77030002922 HC SUT FBRWRE ARTH -B: Performed by: ORTHOPAEDIC SURGERY

## 2017-12-26 PROCEDURE — 77030003602 HC NDL NRV BLK BBMI -B: Performed by: NURSE ANESTHETIST, CERTIFIED REGISTERED

## 2017-12-26 PROCEDURE — 77030018836 HC SOL IRR NACL ICUM -A: Performed by: ORTHOPAEDIC SURGERY

## 2017-12-26 PROCEDURE — 76210000006 HC OR PH I REC 0.5 TO 1 HR: Performed by: ORTHOPAEDIC SURGERY

## 2017-12-26 PROCEDURE — 77030016492 HC BIT DRL1 ZIMM -C: Performed by: ORTHOPAEDIC SURGERY

## 2017-12-26 PROCEDURE — 76942 ECHO GUIDE FOR BIOPSY: CPT | Performed by: ORTHOPAEDIC SURGERY

## 2017-12-26 PROCEDURE — 74011250637 HC RX REV CODE- 250/637: Performed by: ANESTHESIOLOGY

## 2017-12-26 PROCEDURE — 76060000034 HC ANESTHESIA 1.5 TO 2 HR: Performed by: ORTHOPAEDIC SURGERY

## 2017-12-26 PROCEDURE — 77030002916 HC SUT ETHLN J&J -A: Performed by: ORTHOPAEDIC SURGERY

## 2017-12-26 PROCEDURE — 77030004453 HC BUR SHV STRY -B: Performed by: ORTHOPAEDIC SURGERY

## 2017-12-26 PROCEDURE — 77030031139 HC SUT VCRL2 J&J -A: Performed by: ORTHOPAEDIC SURGERY

## 2017-12-26 PROCEDURE — 74011250636 HC RX REV CODE- 250/636

## 2017-12-26 PROCEDURE — 77030006891 HC BLD SHV RESECT STRY -B: Performed by: ORTHOPAEDIC SURGERY

## 2017-12-26 PROCEDURE — 76210000020 HC REC RM PH II FIRST 0.5 HR: Performed by: ORTHOPAEDIC SURGERY

## 2017-12-26 PROCEDURE — 77030011640 HC PAD GRND REM COVD -A: Performed by: ORTHOPAEDIC SURGERY

## 2017-12-26 DEVICE — ANCHOR SUT W/TAPERED NDL 2.9MM --: Type: IMPLANTABLE DEVICE | Site: SHOULDER | Status: FUNCTIONAL

## 2017-12-26 RX ORDER — MIDAZOLAM HYDROCHLORIDE 1 MG/ML
2 INJECTION, SOLUTION INTRAMUSCULAR; INTRAVENOUS
Status: DISCONTINUED | OUTPATIENT
Start: 2017-12-26 | End: 2017-12-26 | Stop reason: HOSPADM

## 2017-12-26 RX ORDER — OXYCODONE HYDROCHLORIDE 5 MG/1
5 TABLET ORAL
Status: DISCONTINUED | OUTPATIENT
Start: 2017-12-26 | End: 2017-12-26 | Stop reason: HOSPADM

## 2017-12-26 RX ORDER — LIDOCAINE HYDROCHLORIDE 10 MG/ML
0.1 INJECTION INFILTRATION; PERINEURAL AS NEEDED
Status: DISCONTINUED | OUTPATIENT
Start: 2017-12-26 | End: 2017-12-26 | Stop reason: HOSPADM

## 2017-12-26 RX ORDER — ONDANSETRON 2 MG/ML
INJECTION INTRAMUSCULAR; INTRAVENOUS AS NEEDED
Status: DISCONTINUED | OUTPATIENT
Start: 2017-12-26 | End: 2017-12-26 | Stop reason: HOSPADM

## 2017-12-26 RX ORDER — SODIUM CHLORIDE, SODIUM LACTATE, POTASSIUM CHLORIDE, CALCIUM CHLORIDE 600; 310; 30; 20 MG/100ML; MG/100ML; MG/100ML; MG/100ML
100 INJECTION, SOLUTION INTRAVENOUS CONTINUOUS
Status: DISCONTINUED | OUTPATIENT
Start: 2017-12-26 | End: 2017-12-26 | Stop reason: HOSPADM

## 2017-12-26 RX ORDER — HYDROMORPHONE HYDROCHLORIDE 2 MG/ML
0.5 INJECTION, SOLUTION INTRAMUSCULAR; INTRAVENOUS; SUBCUTANEOUS
Status: DISCONTINUED | OUTPATIENT
Start: 2017-12-26 | End: 2017-12-26 | Stop reason: HOSPADM

## 2017-12-26 RX ORDER — MIDAZOLAM HYDROCHLORIDE 1 MG/ML
2 INJECTION, SOLUTION INTRAMUSCULAR; INTRAVENOUS ONCE
Status: COMPLETED | OUTPATIENT
Start: 2017-12-26 | End: 2017-12-26

## 2017-12-26 RX ORDER — PROPOFOL 10 MG/ML
INJECTION, EMULSION INTRAVENOUS AS NEEDED
Status: DISCONTINUED | OUTPATIENT
Start: 2017-12-26 | End: 2017-12-26 | Stop reason: HOSPADM

## 2017-12-26 RX ORDER — LIDOCAINE HYDROCHLORIDE 20 MG/ML
INJECTION, SOLUTION EPIDURAL; INFILTRATION; INTRACAUDAL; PERINEURAL AS NEEDED
Status: DISCONTINUED | OUTPATIENT
Start: 2017-12-26 | End: 2017-12-26 | Stop reason: HOSPADM

## 2017-12-26 RX ORDER — FENTANYL CITRATE 50 UG/ML
100 INJECTION, SOLUTION INTRAMUSCULAR; INTRAVENOUS ONCE
Status: COMPLETED | OUTPATIENT
Start: 2017-12-26 | End: 2017-12-26

## 2017-12-26 RX ORDER — DEXAMETHASONE SODIUM PHOSPHATE 4 MG/ML
INJECTION, SOLUTION INTRA-ARTICULAR; INTRALESIONAL; INTRAMUSCULAR; INTRAVENOUS; SOFT TISSUE AS NEEDED
Status: DISCONTINUED | OUTPATIENT
Start: 2017-12-26 | End: 2017-12-26 | Stop reason: HOSPADM

## 2017-12-26 RX ORDER — KETOROLAC TROMETHAMINE 30 MG/ML
INJECTION, SOLUTION INTRAMUSCULAR; INTRAVENOUS AS NEEDED
Status: DISCONTINUED | OUTPATIENT
Start: 2017-12-26 | End: 2017-12-26 | Stop reason: HOSPADM

## 2017-12-26 RX ORDER — GLYCOPYRROLATE 0.2 MG/ML
INJECTION INTRAMUSCULAR; INTRAVENOUS AS NEEDED
Status: DISCONTINUED | OUTPATIENT
Start: 2017-12-26 | End: 2017-12-26 | Stop reason: HOSPADM

## 2017-12-26 RX ORDER — CEFAZOLIN SODIUM/WATER 2 G/20 ML
2 SYRINGE (ML) INTRAVENOUS ONCE
Status: COMPLETED | OUTPATIENT
Start: 2017-12-26 | End: 2017-12-26

## 2017-12-26 RX ORDER — EPHEDRINE SULFATE 50 MG/ML
INJECTION, SOLUTION INTRAVENOUS AS NEEDED
Status: DISCONTINUED | OUTPATIENT
Start: 2017-12-26 | End: 2017-12-26 | Stop reason: HOSPADM

## 2017-12-26 RX ADMIN — ONDANSETRON 4 MG: 2 INJECTION INTRAMUSCULAR; INTRAVENOUS at 07:57

## 2017-12-26 RX ADMIN — Medication 2 G: at 07:40

## 2017-12-26 RX ADMIN — DEXAMETHASONE SODIUM PHOSPHATE 10 MG: 4 INJECTION, SOLUTION INTRA-ARTICULAR; INTRALESIONAL; INTRAMUSCULAR; INTRAVENOUS; SOFT TISSUE at 07:57

## 2017-12-26 RX ADMIN — MIDAZOLAM HYDROCHLORIDE 2 MG: 1 INJECTION, SOLUTION INTRAMUSCULAR; INTRAVENOUS at 06:45

## 2017-12-26 RX ADMIN — OXYCODONE HYDROCHLORIDE 5 MG: 5 TABLET ORAL at 09:15

## 2017-12-26 RX ADMIN — LIDOCAINE HYDROCHLORIDE 60 MG: 20 INJECTION, SOLUTION EPIDURAL; INFILTRATION; INTRACAUDAL; PERINEURAL at 07:31

## 2017-12-26 RX ADMIN — GLYCOPYRROLATE 0.2 MG: 0.2 INJECTION INTRAMUSCULAR; INTRAVENOUS at 08:23

## 2017-12-26 RX ADMIN — HYDROMORPHONE HYDROCHLORIDE 0.5 MG: 2 INJECTION, SOLUTION INTRAMUSCULAR; INTRAVENOUS; SUBCUTANEOUS at 09:40

## 2017-12-26 RX ADMIN — SODIUM CHLORIDE, SODIUM LACTATE, POTASSIUM CHLORIDE, AND CALCIUM CHLORIDE 100 ML/HR: 600; 310; 30; 20 INJECTION, SOLUTION INTRAVENOUS at 05:55

## 2017-12-26 RX ADMIN — PROPOFOL 300 MG: 10 INJECTION, EMULSION INTRAVENOUS at 07:31

## 2017-12-26 RX ADMIN — SODIUM CHLORIDE, SODIUM LACTATE, POTASSIUM CHLORIDE, AND CALCIUM CHLORIDE: 600; 310; 30; 20 INJECTION, SOLUTION INTRAVENOUS at 08:30

## 2017-12-26 RX ADMIN — EPHEDRINE SULFATE 5 MG: 50 INJECTION, SOLUTION INTRAVENOUS at 08:26

## 2017-12-26 RX ADMIN — FENTANYL CITRATE 100 MCG: 50 INJECTION INTRAMUSCULAR; INTRAVENOUS at 06:45

## 2017-12-26 RX ADMIN — KETOROLAC TROMETHAMINE 15 MG: 30 INJECTION, SOLUTION INTRAMUSCULAR; INTRAVENOUS at 08:23

## 2017-12-26 RX ADMIN — HYDROMORPHONE HYDROCHLORIDE 0.5 MG: 2 INJECTION, SOLUTION INTRAMUSCULAR; INTRAVENOUS; SUBCUTANEOUS at 09:30

## 2017-12-26 NOTE — ANESTHESIA PROCEDURE NOTES
Peripheral Block    Start time: 12/26/2017 6:46 AM  End time: 12/26/2017 6:51 AM  Performed by: Alireza Pineda  Authorized by: Alireza Pineda       Pre-procedure: Indications: at surgeon's request, post-op pain management and procedure for pain    Preanesthetic Checklist: patient identified, risks and benefits discussed, site marked, timeout performed, anesthesia consent given and patient being monitored    Timeout Time: 06:45          Block Type:   Block Type: Interscalene  Laterality:  Right  Monitoring:  Standard ASA monitoring, responsive to questions, continuous pulse ox, oxygen, heart rate and frequent vital sign checks  Injection Technique:  Single shot  Procedures: ultrasound guided and nerve stimulator    Patient Position: supine  Prep: chlorhexidine    Location:  Interscalene  Needle Type:  Stimuplex (4 Inch)  Needle Localization:  Nerve stimulator and ultrasound guidance  Motor Response: minimal motor response >0.4 mA    Medication Injected:  0.375%  ropivacaine  Adds:  Epi 1:400K  Volume (mL):  40    Assessment:  Number of attempts:  1  Injection Assessment:  Incremental injection every 5 mL, negative aspiration for blood, no intravascular symptoms and no paresthesia  Patient tolerance:  Patient tolerated the procedure well with no immediate complications  3 cc 1% lidocaine injected at site of needle insertion.

## 2017-12-26 NOTE — ANESTHESIA PREPROCEDURE EVALUATION
Anesthetic History   No history of anesthetic complications            Review of Systems / Medical History  Patient summary reviewed, nursing notes reviewed and pertinent labs reviewed    Pulmonary  Within defined limits                 Neuro/Psych   Within defined limits           Cardiovascular    Hypertension              Exercise tolerance: >4 METS     GI/Hepatic/Renal                Endo/Other        Arthritis, cancer and anemia (sickle cell trait)     Other Findings   Comments: H/O cervical cancer           Physical Exam    Airway  Mallampati: II  TM Distance: 4 - 6 cm  Neck ROM: normal range of motion   Mouth opening: Normal     Cardiovascular    Rhythm: regular           Dental  No notable dental hx       Pulmonary  Breath sounds clear to auscultation               Abdominal  GI exam deferred       Other Findings            Anesthetic Plan    ASA: 2  Anesthesia type: general      Post-op pain plan if not by surgeon: peripheral nerve block single    Induction: Intravenous  Anesthetic plan and risks discussed with: Patient

## 2017-12-26 NOTE — H&P
Sweetie Andrews  History and physical    Subjective  Problem List:.     1. Right knee pain. 2. Ganglion cyst in wrist.   3. Left wrist pain. 4. Left Sternoclavicular separation. 5. left shoulder pain. 6. right shoulder/SLAP lesion/DJD      This 45year old female presents today for evaluation of right shoulder pain/rotator cuff tear. The patient comes in for evaluation, history and physical, and surgical consent signing. The surgical procedure was reviewed in detail with the patient. The risks, including but not limited to anesthesia, infection, deep vein thrombosis, pulmonary embolus, injury to vessels, tendons and nerves, paralysis, stroke, heart attack, loss of limb, and death were discussed. The patient understands the post operative course and all questions were answered. No guarantees are made and all alternatives are given. The patient wishes to proceed with the surgery. Appropriate literature and relevant material was reviewed with the patient. Surgical procedure: right shoulder arthroscopy with rotator cuff repair. .     Known allergies: NKDA. Past Medical History (Major events, hospitalizations, surgeries):  [id number],  rt. ganglion cyst removal-,85 , hysterectomy-, left shoulder arthroscopy-. Ongoing medical problems: cervical cancer, hypertension, allergies. Family medical history: cancer-grandparent, diabetes-grandparent, hypertension-grandparent. Social history: Patient denies tobacco and ETOH use. Patient is  and full time employed. Review of systems. General: Denies weight change, generally healthy,  change in strength or exercise tolerance. Head: Denies headaches,  vertigo,  injury. Eyes: Denies changes in vision, denies diplopia,  tearing, scotomata,  pain. Prescription glasses. .     Ears:  Denies change in hearing,  tinnitus, bleeding, vertigo. Nose: Denies epistaxis,  coryza, obstruction,  discharge.      Mouth: Denies dental difficulties,  gingival bleeding,  use of dentures. Neck: Denies stiffness and pain. Chest: Denies dyspnea, wheezing, hemoptysis, cough. Heart: Denies chest pains,  palpitations, syncope,  orthopnea. Abdomen: Denies change in appetite,  dysphagia, abdominal pains, bowel habit changes, emesis, melena. : Denies urinary urgency,  dysuria, change in nature of urine. Neurologic: Denies weakness, denies tremor,  seizures, changes in mentation,  ataxia. Psychiatric: Denies depressive symptoms, changes in sleep habits,  changes in thought content. Objective  Vital Signs: Height 68 inches; Weight 205 lbs; /77 mmHg; Temp 97.1 F; Pulse 63 bpm; Oxygen Saturation 97 %. .     Patient is a 45year old female who appears her given age and is in no apparent distress. Oriented to person, place and time. Mood and affect are normal and appropriate to the situation. Assessment of respiratory effort reveals even and nonlabored respirations. .     GEN: NAD. Camille Friar Lungs clear to auscultation bilaterally. Heart rate regular without murmur heard. .     She exhibits a nonantalgic, reciprocal gait. She is able to get onto and off of the examination table. Right shoulder MRI (performed 8/21/17) Impression:.     1. Irregular, roughly 1 cm tear of the distal supraspiantus tendon with full-thickness components present. There is no significant tendon retraction. 2. Mild degenerative hypertrophy of the [facility]. 3. Mild OA changes of the glenohumeral joint with a posterior superior labral tear present. Right shoulder examination:. Inspection reveals no external signs of acute injury or trauma. No warmth or erythema noted. Palpation reveals moderately severe diffuse tenderness over the right shoulder. Shoulder motion is limited secondary to pain. No objective instability noted.      Muscle tone is normal.     Neurologic: Sensation is intact and symmetrical in all dermatomes upper extremities. Vascular: Peripheral pulses normal 2/2 upper extremities. Coordination is good. Assessment  right shoulder pain. SLAP lesion, right shoulder. rotator cuff tear, right shoulder. AC joint DJD, right shoulder. [address] DJD, right shoulder. DIAGNOSIS:        SLAP lesion [ICD-10: D80.686B], [ICD-9: 840.7], [SNOMED: 274887426]        Rotator cuff tear of right shoulder, not specified as traumatic [ICD-10: M75.101], [ICD-9: 726.10]        Primary osteoarthritis, right shoulder [ICD-10: M19.011], [ICD-9: 715.11], [SNOMED: 112155504]        Pain in right shoulder [ICD-10: M25.511], [ICD-9: 719.41], [SNOMED: 80149882737304189]        Preop examination [ICD-10: N79.224], [ICD-9: V72.84], [SNOMED: 341994925]  Plan  We discussed the pathophysiology of the diagnosis and options for treatment. .         Discussed with patient the surgical risks. We have talked about the possibility of complications of surgical procedure (right shoulder arthroscopy with rotator cuff repair), including the possibility of damage to nerves, arteries, vessels and tendons, bleeding, infection, the possibility that the patient may sustain medical problems, even death. We have talked about the possibility that the condition may not improve after surgery or that it could actually be worse. Patient seems to understand and accept these possible complications. .     Surgical consent and pre-op labs were obtained in the office today. All questions answered at this time. Patient knows to contact the office with any questions or concerns. .     VERIFICATION OF ANCILLARY DOCUMENTATION:  The portions of the chart completed by ancillary personnel were reviewed by the physician. .     RTC: post-op. Floresita Mccartney        MEDICATIONS:        Ziac 5 mg-6.25 mg oral tablet 1 PO BID                    prescription:   not prescribed during this visit        Singulair 10 mg oral tablet 1 PO qd                    prescription:   not prescribed during this visit        Lasix 20 mg oral tablet 1 PO qd for hypertension                    prescription:   not prescribed during this visit        Vitamin D3 23742 UNIT Oral Tablet once a month                    prescription:   not prescribed during this visit        Protonix 40 MG Oral Tablet Delayed Release 1 tablet (40 mg) orally daily                    prescription:   not prescribed during this visit        TraMADol HCl 50 MG Oral Tablet one every 6 hours as needed for pain called to [facility] [address]                    prescription:   not prescribed during this visit        Mobic 15 MG Oral Tablet 1 tablet (15 mg) orally daily                    prescription:   not prescribed during this visit        Polyethylene Glycol 3350 Oral Powder once daily                    prescription:   not prescribed during this visit

## 2017-12-26 NOTE — DISCHARGE INSTRUCTIONS
May begin pendulum exercises as tolerated. Ice to shoulder overnite. Rx on chart. ACTIVITY  · As tolerated and as directed by your doctor. · Bathe or shower as directed by your doctor. DIET  · Clear liquids until no nausea or vomiting; then light diet for the first day. · Advance to regular diet on second day, unless your doctor orders otherwise. · If nausea and vomiting continues, call your doctor. PAIN  · Take pain medication as directed by your doctor. · Call your doctor if pain is NOT relieved by medication. · DO NOT take aspirin of blood thinners unless directed by your doctor. DRESSING CARE       CALL YOUR DOCTOR IF   · Excessive bleeding that does not stop after holding pressure over the area  · Temperature of 101 degrees F or above  · Excessive redness, swelling or bruising, and/ or green or yellow, smelly discharge from incision    AFTER ANESTHESIA   · For the first 24 hours: DO NOT Drive, Drink alcoholic beverages, or Make important decisions. · Be aware of dizziness following anesthesia and while taking pain medication. DISCHARGE SUMMARY from Nurse    PATIENT INSTRUCTIONS:    After general anesthesia or intravenous sedation, for 24 hours or while taking prescription Narcotics:  · Limit your activities  · Do not drive and operate hazardous machinery  · Do not make important personal or business decisions  · Do  not drink alcoholic beverages  · If you have not urinated within 8 hours after discharge, please contact your surgeon on call. *  Please give a list of your current medications to your Primary Care Provider. *  Please update this list whenever your medications are discontinued, doses are      changed, or new medications (including over-the-counter products) are added. *  Please carry medication information at all times in case of emergency situations.       These are general instructions for a healthy lifestyle:    No smoking/ No tobacco products/ Avoid exposure to second hand smoke    Surgeon General's Warning:  Quitting smoking now greatly reduces serious risk to your health. Obesity, smoking, and sedentary lifestyle greatly increases your risk for illness    A healthy diet, regular physical exercise & weight monitoring are important for maintaining a healthy lifestyle    You may be retaining fluid if you have a history of heart failure or if you experience any of the following symptoms:  Weight gain of 3 pounds or more overnight or 5 pounds in a week, increased swelling in our hands or feet or shortness of breath while lying flat in bed. Please call your doctor as soon as you notice any of these symptoms; do not wait until your next office visit. Recognize signs and symptoms of STROKE:    F-face looks uneven    A-arms unable to move or move unevenly    S-speech slurred or non-existent    T-time-call 911 as soon as signs and symptoms begin-DO NOT go       Back to bed or wait to see if you get better-TIME IS BRAIN.

## 2017-12-26 NOTE — ANESTHESIA POSTPROCEDURE EVALUATION
Post-Anesthesia Evaluation and Assessment    Patient: Sumanth Padilla MRN: [id number]  SSN: [id number]    YOB: 1965  Age: 45 y.o. Sex: female       Cardiovascular Function/Vital Signs  Visit Vitals    BP (!) 150/95    Pulse 78    Temp 36.4 °C (97.6 °F)    Resp 16    Ht 5' 10\" (1.778 m)    Wt 94.3 kg (208 lb)    SpO2 98%    BMI 29.84 kg/m2       Patient is status post general anesthesia for Procedure(s):  RIGHT SHOULDER ARTHROSCOPY SUBACROMIAL DECOMPRESSION/ DAREK PROCEDURE/ROTATOR CUFF REPAIR. Nausea/Vomiting: None    Postoperative hydration reviewed and adequate. Pain:  Pain Scale 1: Numeric (0 - 10) (12/26/17 0954)  Pain Intensity 1: 4 (tolerable) (12/26/17 0954)   Managed    Neurological Status:   Neuro (WDL):  (sleeping) (12/26/17 0904)   At baseline    Mental Status and Level of Consciousness: Arousable    Pulmonary Status:   O2 Device: Room air (12/26/17 0913)   Adequate oxygenation and airway patent    Complications related to anesthesia: None    Post-anesthesia assessment completed.  No concerns    Signed By: Ronald Gao MD     December 26, 2017

## 2017-12-26 NOTE — BRIEF OP NOTE
BRIEF OPERATIVE NOTE    Date of Procedure: 12/26/2017   Preoperative Diagnosis:traumatic tear of right rotator cuff [M75.101]  Detachment of glenoid labrum, right, initial encounter [S43.431A]  Arthrosis of right acromioclavicular joint [M19.011]  Postoperative Diagnosis: Right rotator cuff tear, Impingement, Arthrosis of right acromioclavicular joint     Procedure(s):  RIGHT SHOULDER ARTHROSCOPY SUBACROMIAL DECOMPRESSION/ DAREK PROCEDURE/ROTATOR CUFF REPAIR  Surgeon(s) and Role:     * Brandon Ortiz MD - Primary         Assistant Staff:       Surgical Staff:  Circ-1: Siva Aguirre RN  Scrub Tech-1: Timoteo Cooler; Shanique Wilson  Event Time In   Incision Start 0688   Incision Close 0848     Anesthesia: General   Estimated Blood Loss: minimal  Specimens: * No specimens in log *   Findings:  As aboe   Complications: none noted  Implants:   Implant Name Type Inv.  Item Serial No.  Lot No. LRB No. Used Action   ANCHOR SUT W/TAPERED NDL 2.9MM --  - [id number]   ANCHOR SUT W/TAPERED NDL 2.9MM --    [facility] [id number] 1 Implanted

## 2017-12-26 NOTE — IP AVS SNAPSHOT
303 [facility] 
 
 
 145 [facility] 322 [facility] 
[phone] Patient: Ervin Jeronimo MRN: [id number] :1965 About your hospitalization You were admitted on:  2017 You last received care in the:  [facility] OP PACU You were discharged on:  2017 Why you were hospitalized Your primary diagnosis was:  Not on File Things You Need To Do (next 8 weeks) Follow up with Janel Killian MD  
  
Phone:  [phone] Where:  [facility] [place] [address] Follow up with Arpita Livingston MD  
  
Phone:  [phone] Where:  [facility] [address] Friday Dec 29, 2017 Office Visit with Janel Killian MD at  3:30 PM  
Where:  Jose Longoria ([address]) Discharge Orders None A check tani indicates which time of day the medication should be taken. My Medications TAKE these medications as instructed Instructions Each Dose to Equal  
 Morning Noon Evening Bedtime Biotin 2,500 mcg Cap Your last dose was: Your next dose is: Take 10,000 mcg by mouth daily. Stop seven days prior to surgery per anesthesia protocol. 12476 mcg  
    
   
   
   
  
 bisoprolol 5 mg tablet Commonly known as:  Lunette Romance Your last dose was: Your next dose is: Take 1 Tab by mouth every morning. 5 mg  
    
   
   
   
  
 ergocalciferol 50,000 unit capsule Commonly known as:  VITAMIN D2 Your last dose was: Your next dose is: TAKE ONE CAPSULE BY MOUTH once a month  
     
   
   
   
  
 furosemide 40 mg tablet Commonly known as:  LASIX Your last dose was: Your next dose is: Take 1 Tab by mouth daily. 40 mg  
    
   
   
   
  
 meloxicam 15 mg tablet Commonly known as:  MOBIC Your last dose was: Your next dose is: Take 15 mg by mouth nightly. Stop seven days prior to surgery per anesthesia protocol. 15 mg  
    
   
   
   
  
 montelukast 10 mg tablet Commonly known as:  SINGULAIR Your last dose was: Your next dose is: TAKE 1 TABLET BY MOUTH EVERY DAY  
     
   
   
   
  
 pantoprazole 40 mg tablet Commonly known as:  PROTONIX Your last dose was: Your next dose is: TAKE 1 TAB BY MOUTH DAILY. polyethylene glycol 17 gram/dose powder Commonly known as:  Olegario [place] Your last dose was: Your next dose is: TAKE 17 GM BY MOUTH DAILY MIXED IN 8 OZ OF WATER  
     
   
   
   
  
 traMADol 50 mg tablet Commonly known as:  ULTRAM  
   
Your last dose was: Your next dose is: Take 50 mg by mouth every six (6) hours. Take day of surgery per anesthesia protocol. 50 mg Discharge Instructions May begin pendulum exercises as tolerated. Ice to shoulder overnite. Rx on chart. ACTIVITY · As tolerated and as directed by your doctor. · Bathe or shower as directed by your doctor. DIET · Clear liquids until no nausea or vomiting; then light diet for the first day. · Advance to regular diet on second day, unless your doctor orders otherwise. · If nausea and vomiting continues, call your doctor. PAIN 
· Take pain medication as directed by your doctor. · Call your doctor if pain is NOT relieved by medication. · DO NOT take aspirin of blood thinners unless directed by your doctor. DRESSING CARE  
 
 
 
After general anesthesia or intravenous sedation, for 24 hours or while taking prescription Narcotics: · Limit your activities · Do not drive and operate hazardous machinery · Do not make important personal or business decisions · Do  not drink alcoholic beverages · If you have not urinated within 8 hours after discharge, please contact your surgeon on call. *  Please give a list of your current medications to your Primary Care Provider. *  Please update this list whenever your medications are discontinued, doses are 
    changed, or new medications (including over-the-counter products) are added. *  Please carry medication information at all times in case of emergency situations. These are general instructions for a healthy lifestyle: No smoking/ No tobacco products/ Avoid exposure to second hand smoke Surgeon General's Warning:  Quitting smoking now greatly reduces serious risk to your health. Obesity, smoking, and sedentary lifestyle greatly increases your risk for illness A healthy diet, regular physical exercise & weight monitoring are important for maintaining a healthy lifestyle You may be retaining fluid if you have a history of heart failure or if you experience any of the following symptoms:  Weight gain of 3 pounds or more overnight or 5 pounds in a week, increased swelling in our hands or feet or shortness of breath while lying flat in bed. Please call your doctor as soon as you notice any of these symptoms; do not wait until your next office visit. Recognize signs and symptoms of STROKE: 
 
F-face looks uneven A-arms unable to move or move unevenly S-speech slurred or non-existent T-time-call 911 as soon as signs and symptoms begin-DO NOT go Back to bed or wait to see if you get better-TIME IS BRAIN. Introducing [facility] & HEALTH SERVICES! Dear Ken Petit: Thank you for requesting a [employer] account. Our records indicate that you already have an active [employer] account. You can access your account anytime at [email or web address] Did you know that you can access your hospital and ER discharge instructions at any time in [employer]? You can also review all of your test results from your hospital stay or ER visit. Additional Information If you have questions, please visit the Frequently Asked Questions section of the [employer] website at [email or web address] Remember, [employer] is NOT to be used for urgent needs. For medical emergencies, dial 911. Now available from your iPhone and Android! Providers Seen During Your Hospitalization Provider Specialty Primary office phone Jossue Anglin MD Orthopedic Surgery [phone] Your Primary Care Physician (PCP) Primary Care Physician Office Phone Office Fax Steven Gallo [phone] You are allergic to the following No active allergies Recent Documentation Height Weight BMI OB Status Smoking Status 1.778 m 94.3 kg 29.84 kg/m2 Hysterectomy Former Smoker Emergency Contacts Name Discharge Info Relation Home Work Mobile [address] [facility]  Spouse [3] [phone] Roise Parents [22] [phone] Patient Belongings The following personal items are in your possession at time of discharge: 
  Dental Appliances: None  Visual Aid: Glasses      Home Medications: None   Jewelry: None  Clothing: Shirt, Pants, Jacket/Coat, Footwear, Undergarments    Other Valuables: None Please provide this summary of care documentation to your next provider. Signatures-by signing, you are acknowledging that this After Visit Summary has been reviewed with you and you have received a copy. Patient Signature:  ____________________________________________________________ Date:  ____________________________________________________________  
  
Curlie Ruths Provider Signature:  ____________________________________________________________ Date:  ____________________________________________________________

## 2017-12-26 NOTE — H&P
History and Physical Updated with no interval change. Sapna Yang MD History and Physical Updated with no interval change.  Sapna Yang MD

## 2017-12-29 PROBLEM — E55.9 VITAMIN D DEFICIENCY: Status: ACTIVE | Noted: 2017-12-29

## 2017-12-29 PROBLEM — K21.9 GASTROESOPHAGEAL REFLUX DISEASE WITHOUT ESOPHAGITIS: Status: ACTIVE | Noted: 2017-12-29

## 2018-01-04 NOTE — OP NOTES
[address] REPORT    Danilo Dowell  MR#: [id number]  : 1965  ACCOUNT #: [de-identified]   DATE OF SERVICE: 2017    HISTORY: The patient is well known to me with a previous history of left shoulder problems. She did well with this eventually, she has had progressive pain and inability to use her right shoulder. Subsequent MRI scanning showed a tear of the rotator cuff as well as impingement and mild AC joint DJD. We discussed different treatment options with the patient in detail. She has opted for surgical treatment. She understands the risks and complications and wishes to proceed. PREOPERATIVE DIAGNOSIS:  Impingement with rotator cuff tear, full thickness. POSTOPERATIVE DIAGNOSIS:  Impingement with rotator cuff tear, full thickness. PROCEDURE PERFORMED:  Arthroscopic subacromial decompression and arthroscopy of the shoulder joint and mini open rotator cuff repair. SURGEON:  Taryn Reaves MD.     ANESTHESIA:  General with a preoperative block. ESTIMATED BLOOD LOSS:  minimal    SPECIMENS REMOVED:  none    COMPLICATIONS:  None noted      PROCEDURE IN DETAIL:  The patient had a preoperative block placed in the holding area. Her shoulder was marked. Her chart was updated. She was taken to the operating room, successful general anesthetic was achieved. We utilized the beach chair position. She was placed in the beach chair position with all prominences well padded. The right shoulder was prepped and draped in sterile field. She was given perioperative Ancef per protocol. Once the shoulder was prepped and draped in a sterile field, a time-out was affected by the surgeon and was confirmed by the operative team. Began with a posterior portal two fingerbreadths inferior medial to the posterolateral corner of the acromion. The shoulder was inspected in a systematic fashion. Anterior portal was created with a Wissinger switching lorenzo.  The shoulder was inspected from both these portal positions. There was some mild fraying of the labrum from the 11-1 o'clock, but it was not unstable. We debrided this with a mechanized shaver. We did notice a large tear from underneath. We debrided the undersurface fibers of this. There was some degeneration of the biceps tendon, but it was about 10%. We then went over the top position, performed a  thorough subacromial decompression bony and soft tissue. We localized our tear. We defined the margins with a mechanized shaver. We also used the [facility] debrider as well. We then extended our anterolateral portal, which we made to debride about 2.5 cm distally. We split the deltoid, but did not detach it. We will able to remove further bursal tissue. We then placed in a suture anchor into this area and repaired this and then placed 2 suture anchors and repaired these into the bony trough that we created with a rongeur and very stable repair of the supraspinatus tendon. The tear itself measured about 15 mm in diameter. It was mobile. We were able to achieve a good concentric repair as we had performed a thorough subacromial decompression and then we washed out our portals and our wound, closed deeply with 0 Vicryl in the main incision and then Monocryl and Dermabond on the skin. The portal sites were closed with simple nylon suture. Sterile bulky dressing was applied, abduction pillow and sling. She tolerated the procedure well without any complications and she will be discharged home with appropriate pain medication and instructions.       MD ROGER De La Cruz / MEGAN  D: 01/04/2018 14:32     T: 01/04/2018 15:14  JOB #: [id number]

## 2018-02-08 ENCOUNTER — HOSPITAL ENCOUNTER (OUTPATIENT)
Dept: PHYSICAL THERAPY | Age: 53
Discharge: HOME | End: 2018-02-08
Payer: COMMERCIAL

## 2018-02-08 PROCEDURE — 97161 PT EVAL LOW COMPLEX 20 MIN: CPT

## 2018-02-08 PROCEDURE — 97110 THERAPEUTIC EXERCISES: CPT
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
## 2018-02-08 NOTE — PROGRESS NOTES
Ambulatory/Rehab Services H2 Model Falls Risk Assessment    Risk Factor Pts. ·   Confusion/Disorientation/Impulsivity  []    4 ·   Symptomatic Depression  []   2 ·   Altered Elimination  []   1 ·   Dizziness/Vertigo  []   1 ·   Gender (Male)  []   1 ·   Any administered antiepileptics (anticonvulsants):  []   2 ·   Any administered benzodiazepines:  []   1 ·   Visual Impairment (specify):  []   1 ·   Portable Oxygen Use  []   1 ·   Orthostatic ? BP  []   1 ·   History of Recent Falls (within 3 mos.)  []   5     Ability to Rise from Chair (choose one) Pts. ·   Ability to rise in a single movement  []   0 ·   Pushes up, successful in one attempt  [x]   1 ·   Multiple attempts, but successful  []   3 ·   Unable to rise without assistance  []   4   Total: (5 or greater = High Risk) 1     Falls Prevention Plan:   []                Physical Limitations to Exercise (specify):   []                Mobility Assistance Device (type):   []                Exercise/Equipment Adaptation (specify):    ©2010 [facility] [place] [address] [place] [address] Patent #[id number].  Federal Law prohibits the replication, distribution or use without written permission from [facility] of [address] [place] [address]  2/8/2018

## 2018-02-14 ENCOUNTER — HOSPITAL ENCOUNTER (OUTPATIENT)
Dept: PHYSICAL THERAPY | Age: 53
Discharge: HOME | End: 2018-02-14
Payer: COMMERCIAL

## 2018-02-14 NOTE — PROGRESS NOTES
Christiano Power  : 1965  Primary: Dennie Dike [employer] [facility]  Secondary:  [facility], [address] [facility], [address]  [phone]   [phone]        OUTPATIENT DAILY NOTE    NAME/AGE/GENDER: Christiano Power is a 45 y.o. female. DATE: 2018    Patient canceled physical therapy appointment today due to taking care of grandchild. Will plan to follow up on next scheduled visit.     Brandon Santoyo PT

## 2018-02-19 ENCOUNTER — HOSPITAL ENCOUNTER (OUTPATIENT)
Dept: PHYSICAL THERAPY | Age: 53
Discharge: HOME | End: 2018-02-19
Payer: COMMERCIAL

## 2018-02-19 PROCEDURE — 97140 MANUAL THERAPY 1/> REGIONS: CPT

## 2018-02-19 PROCEDURE — 97110 THERAPEUTIC EXERCISES: CPT
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
## 2018-02-21 ENCOUNTER — HOSPITAL ENCOUNTER (OUTPATIENT)
Dept: PHYSICAL THERAPY | Age: 53
Discharge: HOME | End: 2018-02-21
Payer: COMMERCIAL

## 2018-02-21 PROCEDURE — 97110 THERAPEUTIC EXERCISES: CPT

## 2018-02-21 PROCEDURE — 97140 MANUAL THERAPY 1/> REGIONS: CPT
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
## 2018-02-26 ENCOUNTER — HOSPITAL ENCOUNTER (OUTPATIENT)
Dept: PHYSICAL THERAPY | Age: 53
Discharge: HOME | End: 2018-02-26
Payer: COMMERCIAL

## 2018-02-26 PROCEDURE — 97140 MANUAL THERAPY 1/> REGIONS: CPT

## 2018-02-26 PROCEDURE — 97110 THERAPEUTIC EXERCISES: CPT
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
## 2018-02-28 ENCOUNTER — HOSPITAL ENCOUNTER (OUTPATIENT)
Dept: PHYSICAL THERAPY | Age: 53
Discharge: HOME | End: 2018-02-28
Payer: COMMERCIAL

## 2018-02-28 PROCEDURE — 97140 MANUAL THERAPY 1/> REGIONS: CPT
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
## 2018-03-07 ENCOUNTER — HOSPITAL ENCOUNTER (OUTPATIENT)
Dept: PHYSICAL THERAPY | Age: 53
Discharge: HOME | End: 2018-03-07
Payer: COMMERCIAL

## 2018-03-07 PROCEDURE — 97140 MANUAL THERAPY 1/> REGIONS: CPT

## 2018-03-07 PROCEDURE — 97110 THERAPEUTIC EXERCISES: CPT
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
## 2018-03-09 ENCOUNTER — HOSPITAL ENCOUNTER (OUTPATIENT)
Dept: PHYSICAL THERAPY | Age: 53
Discharge: HOME | End: 2018-03-09
Payer: COMMERCIAL

## 2018-03-09 PROCEDURE — 97110 THERAPEUTIC EXERCISES: CPT

## 2018-03-09 PROCEDURE — 97140 MANUAL THERAPY 1/> REGIONS: CPT
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
## 2018-03-12 ENCOUNTER — HOSPITAL ENCOUNTER (OUTPATIENT)
Dept: PHYSICAL THERAPY | Age: 53
Discharge: HOME | End: 2018-03-12
Payer: COMMERCIAL

## 2018-03-12 PROCEDURE — 97110 THERAPEUTIC EXERCISES: CPT

## 2018-03-12 PROCEDURE — 97140 MANUAL THERAPY 1/> REGIONS: CPT
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
## 2018-03-14 ENCOUNTER — HOSPITAL ENCOUNTER (OUTPATIENT)
Dept: PHYSICAL THERAPY | Age: 53
Discharge: HOME | End: 2018-03-14
Payer: COMMERCIAL

## 2018-03-14 PROCEDURE — 97110 THERAPEUTIC EXERCISES: CPT

## 2018-03-14 PROCEDURE — 97140 MANUAL THERAPY 1/> REGIONS: CPT
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
## 2018-03-19 ENCOUNTER — HOSPITAL ENCOUNTER (OUTPATIENT)
Dept: PHYSICAL THERAPY | Age: 53
Discharge: HOME | End: 2018-03-19
Payer: COMMERCIAL

## 2018-03-19 PROCEDURE — 97110 THERAPEUTIC EXERCISES: CPT

## 2018-03-19 PROCEDURE — 97140 MANUAL THERAPY 1/> REGIONS: CPT
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
## 2018-03-21 ENCOUNTER — HOSPITAL ENCOUNTER (OUTPATIENT)
Dept: PHYSICAL THERAPY | Age: 53
Discharge: HOME | End: 2018-03-21
Payer: COMMERCIAL

## 2018-03-21 PROCEDURE — 97140 MANUAL THERAPY 1/> REGIONS: CPT

## 2018-03-21 PROCEDURE — 97110 THERAPEUTIC EXERCISES: CPT
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
## 2018-03-23 ENCOUNTER — HOSPITAL ENCOUNTER (OUTPATIENT)
Dept: PHYSICAL THERAPY | Age: 53
Discharge: HOME | End: 2018-03-23
Payer: COMMERCIAL

## 2018-03-23 PROCEDURE — 97140 MANUAL THERAPY 1/> REGIONS: CPT

## 2018-03-23 PROCEDURE — 97110 THERAPEUTIC EXERCISES: CPT
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
## 2018-03-26 ENCOUNTER — HOSPITAL ENCOUNTER (OUTPATIENT)
Dept: PHYSICAL THERAPY | Age: 53
Discharge: HOME | End: 2018-03-26
Payer: COMMERCIAL

## 2018-03-26 PROCEDURE — 97140 MANUAL THERAPY 1/> REGIONS: CPT

## 2018-03-26 PROCEDURE — 97110 THERAPEUTIC EXERCISES: CPT
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
## 2018-03-28 ENCOUNTER — HOSPITAL ENCOUNTER (OUTPATIENT)
Dept: PHYSICAL THERAPY | Age: 53
Discharge: HOME | End: 2018-03-28
Payer: COMMERCIAL

## 2018-03-28 NOTE — PROGRESS NOTES
Jolanta Gregorio  : 1965  Primary: [address]*  Secondary:  [facility]  [address] [facility], [address] [place] [address], [address]  [phone]   [phone]        OUTPATIENT DAILY NOTE    NAME/AGE/GENDER: Jolanta Gregorio is a 45 y.o. female. DATE: 3/28/2018    Patient canceled physical therapy appointment today due to her grandchild not feeling well and taking him to doctor. Will plan to follow up on next scheduled visit.     Ester Shelton, PT

## 2018-04-02 ENCOUNTER — HOSPITAL ENCOUNTER (OUTPATIENT)
Dept: PHYSICAL THERAPY | Age: 53
Discharge: HOME | End: 2018-04-02

## 2018-04-02 NOTE — PROGRESS NOTES
Wanita Goodell  : 1965  Primary: [address]*  Secondary:  [facility] at [facility], [address] [facility], [address]  [phone]   [phone]        OUTPATIENT DAILY NOTE    NAME/AGE/GENDER: Wanita Goodell is a 45 y.o. female. DATE: 2018    Patient canceled physical therapy appointment today due to not feeling well. Will plan to follow up on next scheduled visit.     Sejal Freitas, PT

## 2018-04-04 ENCOUNTER — HOSPITAL ENCOUNTER (OUTPATIENT)
Dept: PHYSICAL THERAPY | Age: 53
Discharge: HOME | End: 2018-04-04

## 2018-04-04 NOTE — PROGRESS NOTES
Adam Echevarria  : 1965  Primary: Genette Points [employer] [facility]  Secondary:  [facility] at [facility], [address] [place] [address], [place], [facility]  [phone]   [phone]        OUTPATIENT DAILY NOTE    NAME/AGE/GENDER: Adam Echevarria is a 45 y.o. female. DATE: 2018    Patient canceled appointment today due to not feeling well. Will plan to follow up on next scheduled visit.     Eri Teixeira, PT

## 2018-04-16 ENCOUNTER — HOSPITAL ENCOUNTER (OUTPATIENT)
Dept: PHYSICAL THERAPY | Age: 53
Discharge: HOME | End: 2018-04-16

## 2018-04-16 NOTE — PROGRESS NOTES
Jes Lange  : 1965  Primary: Whit Moscoso*  Secondary:  [facility] at [facility], [address] [facility], [address]  [phone]   [phone]        OUTPATIENT DAILY NOTE    NAME/AGE/GENDER: Jes Lange is a 45 y.o. female. DATE: 2018    Patient canceled physical therapy appointment due to not having childcare. Will follow up on next scheduled visit.        Santosh Dan, PT

## 2018-04-18 ENCOUNTER — APPOINTMENT (OUTPATIENT)
Dept: PHYSICAL THERAPY | Age: 53
End: 2018-04-18

## 2018-04-24 PROBLEM — N95.1 MENOPAUSAL SYMPTOM: Status: ACTIVE | Noted: 2018-04-24

## 2018-04-30 ENCOUNTER — HOSPITAL ENCOUNTER (OUTPATIENT)
Dept: LAB | Age: 53
Discharge: HOME | End: 2018-04-30
Payer: COMMERCIAL

## 2018-04-30 PROBLEM — C53.8 MALIGNANT NEOPLASM OF OVERLAPPING SITES OF CERVIX (HCC): Status: ACTIVE | Noted: 2018-04-30

## 2018-04-30 PROCEDURE — 88142 CYTOPATH C/V THIN LAYER: CPT | Performed by: NURSE PRACTITIONER

## 2018-07-27 ENCOUNTER — HOSPITAL ENCOUNTER (OUTPATIENT)
Dept: LAB | Age: 53
Discharge: HOME | End: 2018-07-27
Attending: INTERNAL MEDICINE
Payer: COMMERCIAL

## 2018-07-27 LAB
ALBUMIN SERPL-MCNC: 4 G/DL (ref 3.5–5)
ALBUMIN/GLOB SERPL: 0.9 {RATIO} (ref 1.2–3.5)
ALP SERPL-CCNC: 73 U/L (ref 50–136)
ALT SERPL-CCNC: 43 U/L (ref 12–65)
ANION GAP SERPL CALC-SCNC: 5 MMOL/L (ref 7–16)
APPEARANCE UR: NORMAL
AST SERPL-CCNC: 37 U/L (ref 15–37)
BASOPHILS # BLD: 0 K/UL (ref 0–0.2)
BASOPHILS NFR BLD: 0 % (ref 0–2)
BILIRUB SERPL-MCNC: 1.3 MG/DL (ref 0.2–1.1)
BILIRUB UR QL: NEGATIVE
BUN SERPL-MCNC: 23 MG/DL (ref 6–23)
CALCIUM SERPL-MCNC: 9.4 MG/DL (ref 8.3–10.4)
CHLORIDE SERPL-SCNC: 103 MMOL/L (ref 98–107)
CHOLEST SERPL-MCNC: 191 MG/DL
CO2 SERPL-SCNC: 32 MMOL/L (ref 21–32)
COLOR UR: YELLOW
CREAT SERPL-MCNC: 1.06 MG/DL (ref 0.6–1)
DIFFERENTIAL METHOD BLD: ABNORMAL
EOSINOPHIL # BLD: 0.1 K/UL (ref 0–0.8)
EOSINOPHIL NFR BLD: 2 % (ref 0.5–7.8)
ERYTHROCYTE [DISTWIDTH] IN BLOOD BY AUTOMATED COUNT: 15.3 % (ref 11.9–14.6)
GLOBULIN SER CALC-MCNC: 4.3 G/DL (ref 2.3–3.5)
GLUCOSE SERPL-MCNC: 85 MG/DL (ref 65–100)
GLUCOSE UR STRIP.AUTO-MCNC: NEGATIVE MG/DL
HCT VFR BLD AUTO: 36.2 % (ref 35.8–46.3)
HDLC SERPL-MCNC: 89 MG/DL (ref 40–60)
HDLC SERPL: 2.1 {RATIO}
HGB BLD-MCNC: 12.6 G/DL (ref 11.7–15.4)
HGB UR QL STRIP: NEGATIVE
IMM GRANULOCYTES # BLD: 0 K/UL (ref 0–0.5)
IMM GRANULOCYTES NFR BLD AUTO: 0 % (ref 0–5)
KETONES UR QL STRIP.AUTO: NEGATIVE MG/DL
LDLC SERPL CALC-MCNC: 93 MG/DL
LEUKOCYTE ESTERASE UR QL STRIP.AUTO: NEGATIVE
LIPID PROFILE,FLP: ABNORMAL
LYMPHOCYTES # BLD: 3.3 K/UL (ref 0.5–4.6)
LYMPHOCYTES NFR BLD: 63 % (ref 13–44)
MAGNESIUM SERPL-MCNC: 2.1 MG/DL (ref 1.8–2.4)
MCH RBC QN AUTO: 28.3 PG (ref 26.1–32.9)
MCHC RBC AUTO-ENTMCNC: 34.8 G/DL (ref 31.4–35)
MCV RBC AUTO: 81.3 FL (ref 79.6–97.8)
MONOCYTES # BLD: 0.4 K/UL (ref 0.1–1.3)
MONOCYTES NFR BLD: 7 % (ref 4–12)
NEUTS SEG # BLD: 1.4 K/UL (ref 1.7–8.2)
NEUTS SEG NFR BLD: 28 % (ref 43–78)
NITRITE UR QL STRIP.AUTO: NEGATIVE
PH UR STRIP: 5 [PH] (ref 5–9)
PLATELET # BLD AUTO: 332 K/UL (ref 150–450)
PMV BLD AUTO: 9.3 FL (ref 10.8–14.1)
POTASSIUM SERPL-SCNC: 4.2 MMOL/L (ref 3.5–5.1)
PROT SERPL-MCNC: 8.3 G/DL (ref 6.3–8.2)
PROT UR STRIP-MCNC: NEGATIVE MG/DL
RBC # BLD AUTO: 4.45 M/UL (ref 4.05–5.25)
SODIUM SERPL-SCNC: 140 MMOL/L (ref 136–145)
SP GR UR REFRACTOMETRY: 1.02 (ref 1–1.02)
TRIGL SERPL-MCNC: 45 MG/DL (ref 35–150)
TSH SERPL DL<=0.005 MIU/L-ACNC: 0.76 UIU/ML (ref 0.36–3.74)
UROBILINOGEN UR QL STRIP.AUTO: 0.2 EU/DL (ref 0.2–1)
VLDLC SERPL CALC-MCNC: 9 MG/DL (ref 6–23)
WBC # BLD AUTO: 5.2 K/UL (ref 4.3–11.1)

## 2018-07-27 PROCEDURE — 85025 COMPLETE CBC W/AUTO DIFF WBC: CPT | Performed by: INTERNAL MEDICINE

## 2018-07-27 PROCEDURE — 36415 COLL VENOUS BLD VENIPUNCTURE: CPT | Performed by: INTERNAL MEDICINE

## 2018-07-27 PROCEDURE — 84443 ASSAY THYROID STIM HORMONE: CPT | Performed by: INTERNAL MEDICINE

## 2018-07-27 PROCEDURE — 80053 COMPREHEN METABOLIC PANEL: CPT | Performed by: INTERNAL MEDICINE

## 2018-07-27 PROCEDURE — 81003 URINALYSIS AUTO W/O SCOPE: CPT | Performed by: INTERNAL MEDICINE

## 2018-07-27 PROCEDURE — 83735 ASSAY OF MAGNESIUM: CPT | Performed by: INTERNAL MEDICINE

## 2018-07-27 PROCEDURE — 80061 LIPID PANEL: CPT | Performed by: INTERNAL MEDICINE

## 2019-02-25 ENCOUNTER — HOSPITAL ENCOUNTER (OUTPATIENT)
Dept: GENERAL RADIOLOGY | Age: 54
Discharge: HOME | End: 2019-02-25
Payer: COMMERCIAL

## 2019-02-25 DIAGNOSIS — M15.9 OSTEOARTHRITIS OF MULTIPLE JOINTS, UNSPECIFIED OSTEOARTHRITIS TYPE: ICD-10-CM

## 2019-02-25 DIAGNOSIS — M19.90 INFLAMMATORY ARTHRITIS: ICD-10-CM

## 2019-02-25 PROCEDURE — 72170 X-RAY EXAM OF PELVIS: CPT

## 2019-02-25 PROCEDURE — 73130 X-RAY EXAM OF HAND: CPT

## 2019-02-25 PROCEDURE — 73630 X-RAY EXAM OF FOOT: CPT

## 2019-02-25 PROCEDURE — 73560 X-RAY EXAM OF KNEE 1 OR 2: CPT

## 2019-05-06 PROBLEM — D06.9 CERVICAL INTRAEPITHELIAL NEOPLASIA GRADE III WITH SEVERE DYSPLASIA: Status: ACTIVE | Noted: 2019-05-06

## 2019-12-05 PROBLEM — Z80.0 FAMILY HISTORY OF COLON CANCER: Status: ACTIVE | Noted: 2019-12-05

## 2019-12-30 ENCOUNTER — ANESTHESIA EVENT (OUTPATIENT)
Dept: ENDOSCOPY | Age: 54
End: 2019-12-30
Payer: COMMERCIAL

## 2019-12-30 NOTE — PROGRESS NOTES
Pt confirmed arrival time to hospital of 0700 for their 0830 procedure. Pt also confirmed they will have  present.

## 2019-12-31 ENCOUNTER — HOSPITAL ENCOUNTER (OUTPATIENT)
Age: 54
Setting detail: OUTPATIENT SURGERY
Discharge: HOME | End: 2019-12-31
Attending: SURGERY | Admitting: SURGERY
Payer: COMMERCIAL

## 2019-12-31 ENCOUNTER — ANESTHESIA (OUTPATIENT)
Dept: ENDOSCOPY | Age: 54
End: 2019-12-31
Payer: COMMERCIAL

## 2019-12-31 VITALS
HEART RATE: 70 BPM | SYSTOLIC BLOOD PRESSURE: 190 MMHG | BODY MASS INDEX: 30.66 KG/M2 | WEIGHT: 207 LBS | HEIGHT: 69 IN | RESPIRATION RATE: 16 BRPM | DIASTOLIC BLOOD PRESSURE: 98 MMHG | TEMPERATURE: 98 F | OXYGEN SATURATION: 100 %

## 2019-12-31 PROCEDURE — 76040000025: Performed by: SURGERY

## 2019-12-31 PROCEDURE — 74011250636 HC RX REV CODE- 250/636: Performed by: NURSE ANESTHETIST, CERTIFIED REGISTERED

## 2019-12-31 PROCEDURE — 74011250636 HC RX REV CODE- 250/636: Performed by: ANESTHESIOLOGY

## 2019-12-31 PROCEDURE — 76060000031 HC ANESTHESIA FIRST 0.5 HR: Performed by: SURGERY

## 2019-12-31 RX ORDER — PROPOFOL 10 MG/ML
INJECTION, EMULSION INTRAVENOUS AS NEEDED
Status: DISCONTINUED | OUTPATIENT
Start: 2019-12-31 | End: 2019-12-31 | Stop reason: HOSPADM

## 2019-12-31 RX ORDER — SODIUM CHLORIDE, SODIUM LACTATE, POTASSIUM CHLORIDE, CALCIUM CHLORIDE 600; 310; 30; 20 MG/100ML; MG/100ML; MG/100ML; MG/100ML
100 INJECTION, SOLUTION INTRAVENOUS CONTINUOUS
Status: DISCONTINUED | OUTPATIENT
Start: 2019-12-31 | End: 2019-12-31 | Stop reason: HOSPADM

## 2019-12-31 RX ORDER — PROPOFOL 10 MG/ML
INJECTION, EMULSION INTRAVENOUS
Status: DISCONTINUED | OUTPATIENT
Start: 2019-12-31 | End: 2019-12-31 | Stop reason: HOSPADM

## 2019-12-31 RX ADMIN — SODIUM CHLORIDE, SODIUM LACTATE, POTASSIUM CHLORIDE, AND CALCIUM CHLORIDE 100 ML/HR: 600; 310; 30; 20 INJECTION, SOLUTION INTRAVENOUS at 07:50

## 2019-12-31 RX ADMIN — PROPOFOL 140 MCG/KG/MIN: 10 INJECTION, EMULSION INTRAVENOUS at 08:29

## 2019-12-31 RX ADMIN — PROPOFOL 50 MG: 10 INJECTION, EMULSION INTRAVENOUS at 08:28

## 2019-12-31 RX ADMIN — PROPOFOL 50 MG: 10 INJECTION, EMULSION INTRAVENOUS at 08:29

## 2019-12-31 NOTE — ANESTHESIA POSTPROCEDURE EVALUATION
Procedure(s):  COLONOSCOPY/ 32.    total IV anesthesia    Anesthesia Post Evaluation      Multimodal analgesia: multimodal analgesia used between 6 hours prior to anesthesia start to PACU discharge  Patient location during evaluation: bedside  Patient participation: complete - patient participated  Level of consciousness: awake and alert  Pain score: 3  Pain management: adequate  Airway patency: patent  Anesthetic complications: no  Cardiovascular status: acceptable and hemodynamically stable  Respiratory status: acceptable  Hydration status: acceptable  Post anesthesia nausea and vomiting:  none      Vitals Value Taken Time   /108 12/31/2019  9:12 AM   Temp 36.7 °C (98 °F) 12/31/2019  8:53 AM   Pulse 57 12/31/2019  9:19 AM   Resp 16 12/31/2019  9:02 AM   SpO2 94 % 12/31/2019  9:19 AM   Vitals shown include unvalidated device data.

## 2019-12-31 NOTE — ROUTINE PROCESS
Pt discharged home with son , son driving, VSS, instructions reviewed with patient and son , understanding verbalized. All belongings sent home with Pt. Pt transported out via wheelchair by gretchen Perez.

## 2019-12-31 NOTE — PROCEDURES
[address] [facility]  [phone]    Colonoscopy Procedure Note    Name: Vivek Oneill     Date: 12/31/2019  Med Record Number: [id number]   Age: 62 y.o. Sex: female   Procedure: Colonoscopy --screening  Pre-operative Diagnosis:  screening  Post-operative Diagnosis: normal   Indications: screening for colon cancer  Anesthesia/Sedation: MAC IV MAC anesthesia Propofol  Procedure Details:    Informed consent was obtained for the procedure, including sedation. Risks of perforation, hemorrhage, adverse drug reaction and aspiration were discussed. The patient was placed in the left lateral decubitus position. Based on the pre-procedure assessment, including review of the patient's medical history, medications, allergies, and review of systems, she had been deemed to be an appropriate candidate for sedation by the Anesthesia Dept. The patient was monitored continuously with ECG tracing, pulse oximetry, blood pressure monitoring, and direct observations. A time out was performed. Once sedation was adequate, a rectal examination was performed. The scope was inserted into the rectum and advanced under direct vision to the cecum, which was identified by the ileocecal valve and appendiceal orifice. The quality of the colonic preparation was good. A careful inspection was made as the colonoscope was withdrawn, including a retroflexed view of the rectum; findings and interventions are described below. Appropriate photodocumentation was obtained. Findings: ANUS: Anal exam reveals no masses or hemorrhoids, sphincter tone is normal.   RECTUM: Rectal exam reveals no masses or hemorrhoids. SIGMOID COLON: The mucosa is normal with good vascular pattern and without ulcers, diverticula, and polyps. DESCENDING COLON: The mucosa is normal with good vascular pattern and without ulcers, diverticula, and polyps.    SPLENIC FLEXURE: The splenic flexure is normal. TRANSVERSE COLON: The mucosa is normal with good vascular pattern and without ulcers, diverticula, and polyps. HEPATIC FLEXURE: The hepatic flexure is normal.   ASCENDING COLON: The mucosa is normal with good vascular pattern and without ulcers, diverticula, and polyps. CECUM: The appendiceal orifice appears normal. The ileocecal valve appears normal.   TERMINAL ILEUM: The terminal ileum was not entered. Specimens: normal     Estimated Blood Loss:  0-minimum           Complications: None; patient tolerated the procedure well. Attending Attestation: I performed the procedure. Impression:  Normal colonoscopy to the cecum, with no evidence of neoplasia, diverticular disease, or mucosal abnormality. Recommendations:-For colon cancer screening in this average-risk patient, colonoscopy may be repeated in 10 years.     Navarro Merritt MD

## 2019-12-31 NOTE — DISCHARGE INSTRUCTIONS
Gastrointestinal Colonoscopy/Flexible Sigmoidoscopy - Lower Exam Discharge Instructions  1. Call Dr. Shruthi Jones at [phone] for any problems or questions. 2. Contact the doctors office for follow up appointment as directed  3. Medication may cause drowsiness for several hours, therefore:  · Do not drive or operate machinery for reminder of the day. · No alcohol today. · Do not make any important or legal decisions for 24 hours. · Do not sign any legal documents for 24 hours. 4. Ordinarily, you may resume regular diet and activity after exam unless otherwise specified by your physician. 5. Because of air put into your colon during exam, you may experience some abdominal distension, relieved by the passage of gas, for several hours. 6. Contact your physician if you have any of the following:  · Excessive amount of bleeding - large amount when having a bowel movement. Occasional specks of blood with bowel movement would not be unusual.  · Severe abdominal pain  · Fever or Chills      Instructions given to Theador July and other family members.       Follow up Colonoscopy in 10 years

## 2019-12-31 NOTE — ANESTHESIA PREPROCEDURE EVALUATION
Anesthetic History   No history of anesthetic complications            Review of Systems / Medical History  Patient summary reviewed, nursing notes reviewed and pertinent labs reviewed    Pulmonary  Within defined limits                 Neuro/Psych   Within defined limits           Cardiovascular    Hypertension: well controlled              Exercise tolerance: >4 METS     GI/Hepatic/Renal     GERD: well controlled           Endo/Other        Arthritis     Other Findings   Comments: H/O cervical cancer    sickle cell trait         Physical Exam    Airway  Mallampati: II  TM Distance: 4 - 6 cm  Neck ROM: normal range of motion   Mouth opening: Normal     Cardiovascular    Rhythm: regular  Rate: normal         Dental  No notable dental hx       Pulmonary  Breath sounds clear to auscultation               Abdominal  GI exam deferred       Other Findings            Anesthetic Plan    ASA: 2  Anesthesia type: total IV anesthesia          Induction: Intravenous  Anesthetic plan and risks discussed with: Patient

## 2020-02-19 PROBLEM — R07.89 OTHER CHEST PAIN: Status: ACTIVE | Noted: 2020-02-19

## 2020-03-31 PROBLEM — M54.2 NECK PAIN: Status: ACTIVE | Noted: 2020-03-31

## 2020-03-31 PROBLEM — R20.0 NUMBNESS OF UPPER EXTREMITY: Status: ACTIVE | Noted: 2020-03-31

## 2020-03-31 PROBLEM — S16.1XXA CERVICAL STRAIN: Status: ACTIVE | Noted: 2020-03-31

## 2020-05-04 NOTE — H&P
[address] [place] [address]  HISTORY AND PHYSICAL    Name:  Genet Tolbert  MR#:  [id number]  :  1965  ACCOUNT #:  [de-identified]  ADMIT DATE:  2020    HISTORY OF PRESENT ILLNESS:  The patient is a pleasant 66-year-old female with recurrent pain in her neck and arms. She underwent a series of cervical epidural steroid injections back in  with excellent long-term results. She was evaluated by Dr. Lavell Thomas who referred her back to us for repeat series of cervical epidural steroid injections at [place] [address]. PAST MEDICAL HISTORY:  1. Hypertension. 2.  History of cervical cancer. PAST SURGICAL HISTORY:  1. Hysterectomy. 2.  Cholecystectomy. CURRENT MEDICATIONS  1. Iron supplements. 2.  Vitamin D.  3.  Carvedilol. 4.  Pantoprazole. 5.  Albuterol inhaler. 6.  Celebrex 200 mg b.i.d.  7.  Tylenol. 8.  Montelukast.  9.  Fluticasone nasal spray. 10.  Potassium 20 mEq each day. ALLERGIES:  NO KNOWN DRUG ALLERGIES. SOCIAL HISTORY:  Denies smoking or alcohol abuse. She maintains full-time gainful employment working in the [employer] FAMILY HISTORY:  Noncontributory. REVIEW OF SYSTEMS:  Noncontributory. PHYSICAL EXAMINATION:  GENERAL APPEARANCE:  Very pleasant middle-aged black female sitting in a chair, in no acute distress. VITAL SIGNS:  Afebrile. Vital signs stable. CHEST:  Clear. HEART:  Regular rate and rhythm. ABDOMEN:  Benign. EXTREMITIES:  No clubbing, cyanosis or edema. MUSCULOSKELETAL:  Exam reveals some discomfort on range of motion of the cervical spine. There is some tenderness to cervical flexion. No evidence of radiculopathy or myelopathy with cervical flexion or extension. There is some mild tenderness to palpation over the cervical spinous processes and over the cervical paraspinous muscles. NEUROLOGIC:  Exam alert and oriented x3. Cranial nerves II-XII grossly intact.   Sensory:  Normal light touch throughout both arms.  Motor:  Normal strength throughout both arms. Reflexes 2+ in both biceps and triceps. SUMMARY:  This is a pleasant 49-year-old female with a flare-up of pain in the neck and arms who presents for a trial of cervical epidural steroid injections. ASSESSMENT:  Neck pain with bilateral upper extremity radiculopathy secondary to cervical disc disease and spinal stenosis. PLAN:  The patient will be taken to the operating room on 05/05/2020 for series of cervical epidural steroid injections with sedation.         Candis Silva MD      [id number]  D:  05/04/2020 17:45  T:  05/04/2020 19:29  JOB #:  [id number]

## 2020-05-05 ENCOUNTER — HOSPITAL ENCOUNTER (OUTPATIENT)
Age: 55
Setting detail: OUTPATIENT SURGERY
Discharge: HOME | End: 2020-05-05
Attending: ANESTHESIOLOGY | Admitting: ANESTHESIOLOGY
Payer: COMMERCIAL

## 2020-05-05 ENCOUNTER — APPOINTMENT (OUTPATIENT)
Dept: GENERAL RADIOLOGY | Age: 55
End: 2020-05-05
Attending: ANESTHESIOLOGY
Payer: COMMERCIAL

## 2020-05-05 VITALS
TEMPERATURE: 98 F | OXYGEN SATURATION: 97 % | RESPIRATION RATE: 16 BRPM | BODY MASS INDEX: 30.51 KG/M2 | SYSTOLIC BLOOD PRESSURE: 137 MMHG | HEIGHT: 69 IN | HEART RATE: 59 BPM | WEIGHT: 206 LBS | DIASTOLIC BLOOD PRESSURE: 72 MMHG

## 2020-05-05 PROCEDURE — 77030014125 HC TY EPDRL BBMI -B: Performed by: ANESTHESIOLOGY

## 2020-05-05 PROCEDURE — 76010000230: Performed by: ANESTHESIOLOGY

## 2020-05-05 PROCEDURE — 74011000250 HC RX REV CODE- 250: Performed by: ANESTHESIOLOGY

## 2020-05-05 PROCEDURE — 74011250636 HC RX REV CODE- 250/636: Performed by: ANESTHESIOLOGY

## 2020-05-05 PROCEDURE — 76210000020 HC REC RM PH II FIRST 0.5 HR: Performed by: ANESTHESIOLOGY

## 2020-05-05 RX ORDER — MIDAZOLAM HYDROCHLORIDE 1 MG/ML
INJECTION, SOLUTION INTRAMUSCULAR; INTRAVENOUS AS NEEDED
Status: DISCONTINUED | OUTPATIENT
Start: 2020-05-05 | End: 2020-05-05 | Stop reason: HOSPADM

## 2020-05-05 RX ORDER — FENTANYL CITRATE 50 UG/ML
INJECTION, SOLUTION INTRAMUSCULAR; INTRAVENOUS AS NEEDED
Status: DISCONTINUED | OUTPATIENT
Start: 2020-05-05 | End: 2020-05-05 | Stop reason: HOSPADM

## 2020-05-05 RX ORDER — SODIUM CHLORIDE 9 MG/ML
INJECTION INTRAMUSCULAR; INTRAVENOUS; SUBCUTANEOUS AS NEEDED
Status: DISCONTINUED | OUTPATIENT
Start: 2020-05-05 | End: 2020-05-05 | Stop reason: HOSPADM

## 2020-05-05 RX ORDER — TRIAMCINOLONE ACETONIDE 40 MG/ML
INJECTION, SUSPENSION INTRA-ARTICULAR; INTRAMUSCULAR AS NEEDED
Status: DISCONTINUED | OUTPATIENT
Start: 2020-05-05 | End: 2020-05-05 | Stop reason: HOSPADM

## 2020-05-05 NOTE — PROCEDURES
[address] [facility]  PROCEDURE NOTE    Name:  Darlene Raphael  MR#:  [id number]  :  1965  ACCOUNT #:  [de-identified]  DATE OF SERVICE:  2020    PREOPERATIVE DIAGNOSES:  Neck pain with bilateral upper extremity radiculopathy secondary to multilevel cervical spondylosis and cervical degenerative joint and disk disease. POSTOPERATIVE DIAGNOSES:  Neck pain with bilateral upper extremity radiculopathy secondary to multilevel cervical spondylosis and cervical degenerative joint and disk disease. PROCEDURE PERFORMED:  1. Cervical epidural steroid injection. 2.  Fluoroscopy. SURGEON:  Matthew Vargas MD    ASSISTANT:  none    ANESTHESIA:  Local with IV sedation. ESTIMATED BLOOD LOSS:  None. FLUIDS:  None. SPECIMENS REMOVED:  none    COMPLICATIONS:  None. CONDITION:  Stable. IMPLANTS:  none    INDICATIONS:  The patient is a 55-year-old female who underwent a series of cervical epidural steroid injections 5 years ago. She did well but has had a recent recurrence of pain. She is referred by Dr. Marychuy Hopper for a trial of cervical epidural steroid injections at [facility]. PROCEDURE:  After informed consent was obtained, a 22-gauge IV was placed in the right arm and the patient was taken to the operating room and positioned in a chair sitting upright. Under fluoroscopic guidance, the C5-C6 interspace was identified and marked. The neck was prepped and draped in the usual sterile fashion. A small skin wheal was made over the C5-C6 interspace using 1% lidocaine and an 18-gauge Tuohy needle was advanced using the loss-of-resistance technique. Upon loss of resistance, no blood or cerebrospinal fluid was noted. After negative aspiration, 6 mL of solution consisting of 4 mL of preservative-free normal saline and 80 mg of triamcinolone were injected. She tolerated the procedure well. There were no complications and she was discharged home in satisfactory condition. All fluoroscopic views were interpreted by me. PLANS:  I have recommended continuing her current medications. I will see her back in two weeks for a second cervical epidural steroid injection.       Paulina Ramires MD      [id number]  D:  05/05/2020 8:04  T:  05/05/2020 9:46  JOB #:  [id number]  CC:  Nette Gleason MD

## 2020-05-05 NOTE — DISCHARGE INSTRUCTIONS
Pain Management Aftercare    Procedure: Epidural Cervical Steroid Injection     Common Side Effects that may last 8-12 hours:  Drowsiness  Slurred speech  Dizziness  Poor balance  Blurred vision     Hangover effect (headache, upset stomach, etc.)    Aftercare Instructions: You must have a responsible adult drive you home. Do not drive a car or operate equipment for at least 12 hours. Do not take any new medications for at least 24 hours unless your doctor has prescribed them and he is aware that you are taking them. Do not drink any alcoholic beverages until the next day. Advance to your normal diet as tolerated. Expect soreness at the injection site that will improve over the next 24 hours. Avoid strenuous exercise or heavy lifting. Pre-injection activities may be resumed tomorrow (unless instructed otherwise by your doctor). Notify your doctor immediately if any of the following symptoms occur:  Severe pain at the injection site  Bleeding or drainage from injection site  Fever 101 degrees F or greater  New or increased weakness/numbness of extremities that does not resolve  Severe headache that disappears or gets better when you lie down  Breathing difficulty  Skin rash  Vomiting  Seizures  Unusual drowsiness    Doctor's Phone Number: [phone]    Follow-up Care:  Appointment with Rossana Iglesias on May 18, 2020 at 9:00 am follow up in office  only if your insurance requires a follow up prior to second injection. Second injection is scheduled for May 19th. Office will call      DISCHARGE SUMMARY from Nurse    PATIENT INSTRUCTIONS:    After general anesthesia or intravenous sedation, for 24 hours or while taking prescription Narcotics:  · Limit your activities  · Do not drive and operate hazardous machinery  · Do not make important personal or business decisions  · Do  not drink alcoholic beverages  · If you have not urinated within 8 hours after discharge, please contact your surgeon on call.     * Please give a list of your current medications to your Primary Care Provider. *  Please update this list whenever your medications are discontinued, doses are      changed, or new medications (including over-the-counter products) are added. *  Please carry medication information at all times in case of emergency situations. These are general instructions for a healthy lifestyle:    No smoking/ No tobacco products/ Avoid exposure to second hand smoke    Surgeon General's Warning:  Quitting smoking now greatly reduces serious risk to your health. Obesity, smoking, and sedentary lifestyle greatly increases your risk for illness    A healthy diet, regular physical exercise & weight monitoring are important for maintaining a healthy lifestyle    You may be retaining fluid if you have a history of heart failure or if you experience any of the following symptoms:  Weight gain of 3 pounds or more overnight or 5 pounds in a week, increased swelling in our hands or feet or shortness of breath while lying flat in bed. Please call your doctor as soon as you notice any of these symptoms; do not wait until your next office visit. Recognize signs and symptoms of STROKE:    F-face looks uneven    A-arms unable to move or move unevenly    S-speech slurred or non-existent    T-time-call 911 as soon as signs and symptoms begin-DO NOT go       Back to bed or wait to see if you get better-TIME IS BRAIN. Advance Care Planning  People with COVID-19 may have no symptoms, mild symptoms, such as fever, cough, and shortness of breath or they may have more severe illness, developing severe and fatal pneumonia. As a result, Advance Care Planning with attention to naming a health care decision maker (someone you trust to make healthcare decisions for you if you could not speak for yourself) and sharing other health care preferences is important BEFORE a possible health crisis.  Please contact your Primary Care Provider to discuss Advance Care Planning. Preventing the Spread of Coronavirus Disease 2019 in Homes and Residential Communities  For the most recent information go to [employer]    Prevention steps for People with confirmed or suspected COVID-19 (including persons under investigation) who do not need to be hospitalized  and   People with confirmed COVID-19 who were hospitalized and determined to be medically stable to go home    Your healthcare provider and public health staff will evaluate whether you can be cared for at home. If it is determined that you do not need to be hospitalized and can be isolated at home, you will be monitored by staff from your local or state health department. You should follow the prevention steps below until a healthcare provider or local or state health department says you can return to your normal activities. Stay home except to get medical care  People who are mildly ill with COVID-19 are able to isolate at home during their illness. You should restrict activities outside your home, except for getting medical care. Do not go to work, school, or public areas. Avoid using public transportation, ride-sharing, or taxis. Separate yourself from other people and animals in your home  People: As much as possible, you should stay in a specific room and away from other people in your home. Also, you should use a separate bathroom, if available. Animals: You should restrict contact with pets and other animals while you are sick with COVID-19, just like you would around other people. Although there have not been reports of pets or other animals becoming sick with COVID-19, it is still recommended that people sick with COVID-19 limit contact with animals until more information is known about the virus. When possible, have another member of your household care for your animals while you are sick.  If you are sick with COVID-19, avoid contact with your pet, including petting, snuggling, being kissed or licked, and sharing food. If you must care for your pet or be around animals while you are sick, wash your hands before and after you interact with pets and wear a facemask. Call ahead before visiting your doctor  If you have a medical appointment, call the healthcare provider and tell them that you have or may have COVID-19. This will help the healthcare providers office take steps to keep other people from getting infected or exposed. Wear a facemask  You should wear a facemask when you are around other people (e.g., sharing a room or vehicle) or pets and before you enter a healthcare providers office. If you are not able to wear a facemask (for example, because it causes trouble breathing), then people who live with you should not stay in the same room with you, or they should wear a facemask if they enter your room. Cover your coughs and sneezes  Cover your mouth and nose with a tissue when you cough or sneeze. Throw used tissues in a lined trash can. Immediately wash your hands with soap and water for at least 20 seconds or, if soap and water are not available, clean your hands with an alcohol-based hand  that contains at least 60% alcohol. Clean your hands often  Wash your hands often with soap and water for at least 20 seconds, especially after blowing your nose, coughing, or sneezing; going to the bathroom; and before eating or preparing food. If soap and water are not readily available, use an alcohol-based hand  with at least 60% alcohol, covering all surfaces of your hands and rubbing them together until they feel dry. Soap and water are the best option if hands are visibly dirty. Avoid touching your eyes, nose, and mouth with unwashed hands. Avoid sharing personal household items  You should not share dishes, drinking glasses, cups, eating utensils, towels, or bedding with other people or pets in your home. After using these items, they should be washed thoroughly with soap and water. Clean all high-touch surfaces everyday  High touch surfaces include counters, tabletops, doorknobs, bathroom fixtures, toilets, phones, keyboards, tablets, and bedside tables. Also, clean any surfaces that may have blood, stool, or body fluids on them. Use a household cleaning spray or wipe, according to the label instructions. Labels contain instructions for safe and effective use of the cleaning product including precautions you should take when applying the product, such as wearing gloves and making sure you have good ventilation during use of the product. Monitor your symptoms  Seek prompt medical attention if your illness is worsening (e.g., difficulty breathing). Before seeking care, call your healthcare provider and tell them that you have, or are being evaluated for, COVID-19. Put on a facemask before you enter the facility. These steps will help the healthcare providers office to keep other people in the office or waiting room from getting infected or exposed. Ask your healthcare provider to call the local or state health department. Persons who are placed under active monitoring or facilitated self-monitoring should follow instructions provided by their local health department or occupational health professionals, as appropriate. When working with your local health department check their available hours. If you have a medical emergency and need to call 911, notify the dispatch personnel that you have, or are being evaluated for COVID-19. If possible, put on a facemask before emergency medical services arrive. Discontinuing home isolation  Patients with confirmed COVID-19 should remain under home isolation precautions until the risk of secondary transmission to others is thought to be low.  The decision to discontinue home isolation precautions should be made on a case-by-case basis, in consultation with healthcare providers and state and local health departments.

## 2020-05-05 NOTE — BRIEF OP NOTE
Brief Postoperative Note    Patient: Jeff Jefferson  YOB: 1965  MRN: [id number]    Date of Procedure: 5/5/2020     Pre-Op Diagnosis: Postlaminectomy syndrome, cervical region [M96.1]    Post-Op D: same     Procedure(s):  CERVICAL EPIDURAL STEROID INJECTION    Surgeon(s):  Yazan Díaz MD    Surgical Assistant: None    Anesthesia: Con-Sed     Estimated Blood Loss (mL):none    Complications:none    Specimens: none    ImplantS: none    Drains: none    Findings: none    Electronically Signed by Edgard Scott MD on 5/5/2020 at 8:12 AM

## 2020-05-06 ENCOUNTER — PATIENT OUTREACH (OUTPATIENT)
Dept: OTHER | Age: 55
End: 2020-05-06

## 2020-05-06 NOTE — PROGRESS NOTES
Patient on report as eligible for Case Management. Left discreet message on voicemail with this CM contact information. Will attempt to contact again to offer [phone] Management services.

## 2020-05-07 ENCOUNTER — PATIENT OUTREACH (OUTPATIENT)
Dept: OTHER | Age: 55
End: 2020-05-07

## 2020-05-07 NOTE — PROGRESS NOTES
Verified  and address for HIPAA security. Introduced [employer] for patient. Patient does not identify any Care Management needs at this time and declines services. CM spoke with patient, who reports she is doing great, has returned to work and usual activity with no complications. Patient states had immediate relief from symptoms with injection. Hs plans for follow up on 20 and possible second injection on 20. Reviewed Red Flag Symptoms and when to call MD or go to ER. Patient verbalized understanding and has had KELLY's in past as well. Declines CM services at this time,but has ECM contact information if needed.

## 2020-05-19 ENCOUNTER — APPOINTMENT (OUTPATIENT)
Dept: GENERAL RADIOLOGY | Age: 55
End: 2020-05-19
Attending: ANESTHESIOLOGY
Payer: COMMERCIAL

## 2020-05-19 ENCOUNTER — HOSPITAL ENCOUNTER (OUTPATIENT)
Age: 55
Setting detail: OUTPATIENT SURGERY
Discharge: HOME | End: 2020-05-19
Attending: ANESTHESIOLOGY | Admitting: ANESTHESIOLOGY
Payer: COMMERCIAL

## 2020-05-19 VITALS
SYSTOLIC BLOOD PRESSURE: 93 MMHG | DIASTOLIC BLOOD PRESSURE: 56 MMHG | OXYGEN SATURATION: 98 % | RESPIRATION RATE: 16 BRPM | HEART RATE: 59 BPM | TEMPERATURE: 97.8 F

## 2020-05-19 PROCEDURE — 77030018842 HC SOL IRR SOD CL 9% BAXT -A: Performed by: ANESTHESIOLOGY

## 2020-05-19 PROCEDURE — 74011250636 HC RX REV CODE- 250/636: Performed by: ANESTHESIOLOGY

## 2020-05-19 PROCEDURE — 76010000093 HC SPECIAL PROCEDURE: Performed by: ANESTHESIOLOGY

## 2020-05-19 PROCEDURE — 77030014125 HC TY EPDRL BBMI -B: Performed by: ANESTHESIOLOGY

## 2020-05-19 PROCEDURE — 76210000021 HC REC RM PH II 0.5 TO 1 HR: Performed by: ANESTHESIOLOGY

## 2020-05-19 RX ORDER — FENTANYL CITRATE 50 UG/ML
INJECTION, SOLUTION INTRAMUSCULAR; INTRAVENOUS AS NEEDED
Status: DISCONTINUED | OUTPATIENT
Start: 2020-05-19 | End: 2020-05-19 | Stop reason: HOSPADM

## 2020-05-19 RX ORDER — TRIAMCINOLONE ACETONIDE 40 MG/ML
INJECTION, SUSPENSION INTRA-ARTICULAR; INTRAMUSCULAR AS NEEDED
Status: DISCONTINUED | OUTPATIENT
Start: 2020-05-19 | End: 2020-05-19 | Stop reason: HOSPADM

## 2020-05-19 RX ORDER — MIDAZOLAM HYDROCHLORIDE 1 MG/ML
INJECTION, SOLUTION INTRAMUSCULAR; INTRAVENOUS AS NEEDED
Status: DISCONTINUED | OUTPATIENT
Start: 2020-05-19 | End: 2020-05-19 | Stop reason: HOSPADM

## 2020-05-19 NOTE — BRIEF OP NOTE
Brief Postoperative Note    Patient: Xochilt Marquez  YOB: 1965  MRN: [id number]    Date of Procedure: 5/19/2020     Pre-Op Diagnosis: Postlaminectomy syndrome, cervical region [M96.1]    Post-Op Diagnosis: same      Procedure(s):  CERVICAL EPIDURAL STEROID INJECTION    Surgeon(s):  Dhaval Nayak MD    Surgical Assistant: None    Anesthesia: Con-Sed     Estimated Blood Loss (mL):none    Complications: None    Specimens: * No specimens in log *     Implants: * No implants in log *    Drains: * No LDAs found *    Findings: none    Electronically Signed by Ant Ervin MD on 5/19/2020 at 8:02 AM

## 2020-05-19 NOTE — PROCEDURES
[address] [facility]  PROCEDURE NOTE    Name:  Ahsan Simmons  MR#:  [id number]  :  1965  ACCOUNT #:  [de-identified]  DATE OF SERVICE:  2020    PREOPERATIVE DIAGNOSES:  Neck pain with bilateral upper extremity radiculopathy secondary to multilevel cervical spondylosis and cervical degenerative joint and disk disease. POSTOPERATIVE DIAGNOSES:  Neck pain with bilateral upper extremity radiculopathy secondary to multilevel cervical spondylosis and cervical degenerative joint and disk disease. PROCEDURE PERFORMED:  1. Cervical epidural steroid injection #2.  2.  Fluoroscopy. SURGEON:  Sara Whitley MD    ASSISTANT:  none    ANESTHESIA:  None. ESTIMATED BLOOD LOSS:  None. SPECIMENS REMOVED:  none    COMPLICATIONS:  None. IMPLANTS:  none    FLUIDS:  None. CONDITION:  Stable. INDICATIONS:  The patient is a very pleasant 64-year-old female with pain in her neck and arms. She presents today for a second cervical epidural steroid injection at [facility]. PROCEDURE:  After informed consent was obtained, the patient was given a 22-gauge IV to the right arm and taken to the operating room and positioned in a chair sitting upright. The C5-C6 interspace was identified under fluoroscopy. The neck was prepped and draped in the usual sterile fashion. A small skin wheal was made over the C5-C6 interspace using 1% lidocaine and an 18-gauge Tuohy needle was advanced using the loss-of-resistance technique. Upon loss of resistance, no blood or cerebrospinal fluid was noted. After negative aspiration, 6 mL of solution consisting of 4 mL of preservative-free normal saline and 80 mg of triamcinolone were injected. She tolerated the procedure well. There were no complications. She was discharged home in satisfactory condition. All fluoroscopic views were interpreted by me. PLANS:  1.  I recommended continuing Celebrex 200 mg b.i.d. for inflammation.   2.  I will see her back in two weeks for a possible third cervical epidural steroid injection if needed.     Luma Zuleta MD      [id number]  D:  05/19/2020 8:07  T:  05/19/2020 10:53  JOB #:  [id number]

## 2020-05-19 NOTE — DISCHARGE INSTRUCTIONS
Pain Management Aftercare    Common Side Effects that may last 8-12 hours:  Drowsiness  Slurred speech  Dizziness  Poor balance  Blurred vision     Hangover effect (headache, upset stomach, etc.)    Aftercare Instructions: You must have a responsible adult drive you home. Do not drive a car or operate equipment for at least 12 hours. Do not take any new medications for at least 24 hours unless your doctor has prescribed them and he is aware that you are taking them. Do not drink any alcoholic beverages until the next day. Advance to your normal diet as tolerated. Expect soreness at the injection site that will improve over the next 24 hours. Avoid strenuous exercise or heavy lifting. Pre-injection activities may be resumed tomorrow (unless instructed otherwise by your doctor). Notify your doctor immediately if any of the following symptoms occur:  Severe pain at the injection site  Bleeding or drainage from injection site  Fever 101 degrees F or greater  New or increased weakness/numbness of extremities that does not resolve  Severe headache that disappears or gets better when you lie down  Breathing difficulty  Skin rash  Vomiting  Seizures  Unusual drowsiness    Doctor's Phone Number: [phone]    Follow-up Care: 6/2/20 possible # 3      DISCHARGE SUMMARY from Nurse    PATIENT INSTRUCTIONS:    After general anesthesia or intravenous sedation, for 24 hours or while taking prescription Narcotics:  · Limit your activities  · Do not drive and operate hazardous machinery  · Do not make important personal or business decisions  · Do  not drink alcoholic beverages  · If you have not urinated within 8 hours after discharge, please contact your surgeon on call. *  Please give a list of your current medications to your Primary Care Provider.     *  Please update this list whenever your medications are discontinued, doses are      changed, or new medications (including over-the-counter products) are added.    *  Please carry medication information at all times in case of emergency situations. These are general instructions for a healthy lifestyle:    No smoking/ No tobacco products/ Avoid exposure to second hand smoke    Surgeon General's Warning:  Quitting smoking now greatly reduces serious risk to your health. Obesity, smoking, and sedentary lifestyle greatly increases your risk for illness    A healthy diet, regular physical exercise & weight monitoring are important for maintaining a healthy lifestyle    You may be retaining fluid if you have a history of heart failure or if you experience any of the following symptoms:  Weight gain of 3 pounds or more overnight or 5 pounds in a week, increased swelling in our hands or feet or shortness of breath while lying flat in bed. Please call your doctor as soon as you notice any of these symptoms; do not wait until your next office visit. Recognize signs and symptoms of STROKE:    F-face looks uneven    A-arms unable to move or move unevenly    S-speech slurred or non-existent    T-time-call 911 as soon as signs and symptoms begin-DO NOT go       Back to bed or wait to see if you get better-TIME IS BRAIN. Advance Care Planning  People with COVID-19 may have no symptoms, mild symptoms, such as fever, cough, and shortness of breath or they may have more severe illness, developing severe and fatal pneumonia. As a result, Advance Care Planning with attention to naming a health care decision maker (someone you trust to make healthcare decisions for you if you could not speak for yourself) and sharing other health care preferences is important BEFORE a possible health crisis. Please contact your Primary Care Provider to discuss Advance Care Planning.      Preventing the Spread of Coronavirus Disease 2019 in Homes and Residential Communities  For the most recent information go to [employer]    Prevention steps for People with confirmed or suspected COVID-19 (including persons under investigation) who do not need to be hospitalized  and   People with confirmed COVID-19 who were hospitalized and determined to be medically stable to go home    Your healthcare provider and public health staff will evaluate whether you can be cared for at home. If it is determined that you do not need to be hospitalized and can be isolated at home, you will be monitored by staff from your local or state health department. You should follow the prevention steps below until a healthcare provider or local or state health department says you can return to your normal activities. Stay home except to get medical care  People who are mildly ill with COVID-19 are able to isolate at home during their illness. You should restrict activities outside your home, except for getting medical care. Do not go to work, school, or public areas. Avoid using public transportation, ride-sharing, or taxis. Separate yourself from other people and animals in your home  People: As much as possible, you should stay in a specific room and away from other people in your home. Also, you should use a separate bathroom, if available. Animals: You should restrict contact with pets and other animals while you are sick with COVID-19, just like you would around other people. Although there have not been reports of pets or other animals becoming sick with COVID-19, it is still recommended that people sick with COVID-19 limit contact with animals until more information is known about the virus. When possible, have another member of your household care for your animals while you are sick. If you are sick with COVID-19, avoid contact with your pet, including petting, snuggling, being kissed or licked, and sharing food.  If you must care for your pet or be around animals while you are sick, wash your hands before and after you interact with pets and wear a facemask. Call ahead before visiting your doctor  If you have a medical appointment, call the healthcare provider and tell them that you have or may have COVID-19. This will help the healthcare providers office take steps to keep other people from getting infected or exposed. Wear a facemask  You should wear a facemask when you are around other people (e.g., sharing a room or vehicle) or pets and before you enter a healthcare providers office. If you are not able to wear a facemask (for example, because it causes trouble breathing), then people who live with you should not stay in the same room with you, or they should wear a facemask if they enter your room. Cover your coughs and sneezes  Cover your mouth and nose with a tissue when you cough or sneeze. Throw used tissues in a lined trash can. Immediately wash your hands with soap and water for at least 20 seconds or, if soap and water are not available, clean your hands with an alcohol-based hand  that contains at least 60% alcohol. Clean your hands often  Wash your hands often with soap and water for at least 20 seconds, especially after blowing your nose, coughing, or sneezing; going to the bathroom; and before eating or preparing food. If soap and water are not readily available, use an alcohol-based hand  with at least 60% alcohol, covering all surfaces of your hands and rubbing them together until they feel dry. Soap and water are the best option if hands are visibly dirty. Avoid touching your eyes, nose, and mouth with unwashed hands. Avoid sharing personal household items  You should not share dishes, drinking glasses, cups, eating utensils, towels, or bedding with other people or pets in your home. After using these items, they should be washed thoroughly with soap and water.   Clean all high-touch surfaces everyday  High touch surfaces include counters, tabletops, doorknobs, bathroom fixtures, toilets, phones, keyboards, tablets, and bedside tables. Also, clean any surfaces that may have blood, stool, or body fluids on them. Use a household cleaning spray or wipe, according to the label instructions. Labels contain instructions for safe and effective use of the cleaning product including precautions you should take when applying the product, such as wearing gloves and making sure you have good ventilation during use of the product. Monitor your symptoms  Seek prompt medical attention if your illness is worsening (e.g., difficulty breathing). Before seeking care, call your healthcare provider and tell them that you have, or are being evaluated for, COVID-19. Put on a facemask before you enter the facility. These steps will help the healthcare providers office to keep other people in the office or waiting room from getting infected or exposed. Ask your healthcare provider to call the local or state health department. Persons who are placed under active monitoring or facilitated self-monitoring should follow instructions provided by their local health department or occupational health professionals, as appropriate. When working with your local health department check their available hours. If you have a medical emergency and need to call 911, notify the dispatch personnel that you have, or are being evaluated for COVID-19. If possible, put on a facemask before emergency medical services arrive. Discontinuing home isolation  Patients with confirmed COVID-19 should remain under home isolation precautions until the risk of secondary transmission to others is thought to be low. The decision to discontinue home isolation precautions should be made on a case-by-case basis, in consultation with healthcare providers and state and local health departments.

## 2020-05-20 ENCOUNTER — PATIENT OUTREACH (OUTPATIENT)
Dept: OTHER | Age: 55
End: 2020-05-20

## 2020-05-20 NOTE — PROGRESS NOTES
Patient on report as eligible for Case Management. Left discreet message on voicemail with this CM contact information. Will attempt to contact again to offer [phone] Management services.

## 2020-05-21 ENCOUNTER — PATIENT OUTREACH (OUTPATIENT)
Dept: OTHER | Age: 55
End: 2020-05-21

## 2020-05-21 NOTE — PROGRESS NOTES
Patient identified as eligible for [address] [place] [address] services. Second telephone outreach attempted. Left discreet voicemail with this CM confidential contact information. Will send UTR letter.

## 2020-06-09 ENCOUNTER — HOSPITAL ENCOUNTER (OUTPATIENT)
Age: 55
Setting detail: OUTPATIENT SURGERY
Discharge: HOME | End: 2020-06-09
Attending: ANESTHESIOLOGY | Admitting: ANESTHESIOLOGY
Payer: COMMERCIAL

## 2020-06-09 ENCOUNTER — APPOINTMENT (OUTPATIENT)
Dept: GENERAL RADIOLOGY | Age: 55
End: 2020-06-09
Attending: ANESTHESIOLOGY
Payer: COMMERCIAL

## 2020-06-09 VITALS
BODY MASS INDEX: 29.39 KG/M2 | RESPIRATION RATE: 15 BRPM | OXYGEN SATURATION: 99 % | TEMPERATURE: 98.5 F | HEART RATE: 61 BPM | WEIGHT: 199 LBS | DIASTOLIC BLOOD PRESSURE: 61 MMHG | SYSTOLIC BLOOD PRESSURE: 99 MMHG

## 2020-06-09 PROCEDURE — 74011250636 HC RX REV CODE- 250/636: Performed by: ANESTHESIOLOGY

## 2020-06-09 PROCEDURE — 76010000230: Performed by: ANESTHESIOLOGY

## 2020-06-09 PROCEDURE — 77030014125 HC TY EPDRL BBMI -B: Performed by: ANESTHESIOLOGY

## 2020-06-09 PROCEDURE — 76210000021 HC REC RM PH II 0.5 TO 1 HR: Performed by: ANESTHESIOLOGY

## 2020-06-09 RX ORDER — FENTANYL CITRATE 50 UG/ML
INJECTION, SOLUTION INTRAMUSCULAR; INTRAVENOUS AS NEEDED
Status: DISCONTINUED | OUTPATIENT
Start: 2020-06-09 | End: 2020-06-09 | Stop reason: HOSPADM

## 2020-06-09 RX ORDER — TRIAMCINOLONE ACETONIDE 40 MG/ML
INJECTION, SUSPENSION INTRA-ARTICULAR; INTRAMUSCULAR AS NEEDED
Status: DISCONTINUED | OUTPATIENT
Start: 2020-06-09 | End: 2020-06-09 | Stop reason: HOSPADM

## 2020-06-09 RX ORDER — MIDAZOLAM HYDROCHLORIDE 1 MG/ML
INJECTION, SOLUTION INTRAMUSCULAR; INTRAVENOUS AS NEEDED
Status: DISCONTINUED | OUTPATIENT
Start: 2020-06-09 | End: 2020-06-09 | Stop reason: HOSPADM

## 2020-06-09 NOTE — BRIEF OP NOTE
Brief Postoperative Note    Patient: Anabel Harrington  YOB: 1965  MRN: [id number]    Date of Procedure: 6/9/2020     Pre-Op Diagnosis: Postlaminectomy syndrome [M96.1]    Post-Op Diagnosis: same     Procedure(s):  CERVICAL EPIDURAL STEROID INJECTION    Surgeon(s):  Joe Dong MD    Surgical Assistant: None    Anesthesia: Con-Sed     Estimated Blood Loss (mL): none    Complications: none    Specimens: none     Implants:none    Drains: none    Findings: none    Electronically Signed by Willie العلي MD on 6/9/2020 at 8:23 AM

## 2020-06-09 NOTE — PERIOP NOTES
PACU DISCHARGE NOTE  Vital signs stable, pain well controlled, alert and oriented times three or at baseline, no anesthetic complications. IV removed with catheter tip intact. Written and verbal discharge instructions given, including pain control and follow up appointment. Son verbalized understanding and signed discharge instructions. All questions answered prior to discharge. Dr De La Paz Carrier okay to discharge at this time. Pt and all belongings taken via wheelchair and safely put in vehicle.

## 2020-06-09 NOTE — PROCEDURES
[address] [facility]  PROCEDURE NOTE    Name:  Jd Musa  MR#:  [id number]  :  1965  ACCOUNT #:  [de-identified]  DATE OF SERVICE:  2020    PREOPERATIVE DIAGNOSES:  Neck pain with bilateral upper extremity radiculopathy secondary to multilevel cervical spondylosis and cervical disk disease. POSTOPERATIVE DIAGNOSES:  Neck pain with bilateral upper extremity radiculopathy secondary to multilevel cervical spondylosis and cervical disk disease. PROCEDURE PERFORMED:  1. Cervical epidural steroid injection #3.  2.  Fluoroscopy. SURGEON:  Eunice Barrow MD    ASSISTANT:  none    ANESTHESIA:  None. ESTIMATED BLOOD LOSS:  None. SPECIMENS REMOVED:  none    COMPLICATIONS:  None. IMPLANTS:  none    FLUIDS:  None. CONDITION:  Stable. INDICATIONS:  The patient is a pleasant 49-year-old female with a flareup of pain in her neck and arms. She presents for her third cervical epidural steroid injection today in the office. She is doing a lot better since her first two injections. PROCEDURE:  After informed consent was obtained, a 22-gauge IV was placed in the right arm and the patient was taken to the operating room and positioned in a chair sitting upright. Monitors were applied. Vital signs were stable. The C5-C6 interspace was identified under fluoroscopy. The neck was prepped and draped in the usual sterile fashion. A small skin wheal was made over the C5-C6 interspace using 1% lidocaine and an 18-gauge Tuohy needle was advanced using the loss-of-resistance technique. Upon loss of resistance, no blood or cerebrospinal fluid was noted. After negative aspiration, 6 mL of solution consisting of 4 mL of preservative-free normal saline and 80 mg of triamcinolone were injected. She tolerated the procedure well. There were no complications and she was transferred to the recovery room in satisfactory condition. All fluoroscopic views were interpreted by me. PLANS:  1. I recommended continuing Celebrex 200 mg per day. 2.  I will see her back in four to six weeks for followup in my office.     Inge Raymond MD      [id number]  D:  06/09/2020 8:16  T:  06/09/2020 10:10  JOB #:  [id number]

## 2020-06-10 ENCOUNTER — PATIENT OUTREACH (OUTPATIENT)
Dept: OTHER | Age: 55
End: 2020-06-10

## 2020-06-10 NOTE — PROGRESS NOTES
Verified  and address for HIPAA security. Introduced [employer] for patient. Patient does not identify any Care Management needs at this time and declines services. Patient is S/P 3 of 3 cervical epidural steroid injections on 20 for post laminectomy syndrome. CM spoke with patient who reports she is doing very well, back to work today with no problems. States injections have relieved pain. Reviewed Red Flag Symptoms and discharge instructions, patient verbalized understanding. Has follow up scheduled. Declined CM services at this time, but has CM contact information if needed.

## 2020-11-11 ENCOUNTER — APPOINTMENT (OUTPATIENT)
Dept: PHYSICAL THERAPY | Age: 55
End: 2020-11-11

## 2020-11-16 ENCOUNTER — HOSPITAL ENCOUNTER (OUTPATIENT)
Dept: PHYSICAL THERAPY | Age: 55
Discharge: HOME | End: 2020-11-16
Payer: COMMERCIAL

## 2020-11-16 PROCEDURE — 97161 PT EVAL LOW COMPLEX 20 MIN: CPT

## 2020-11-16 NOTE — THERAPY EVALUATION
Bertin Levin  : 1965  Primary: [facility]  Secondary:  Therapy Center at [facility], [address], [place], [facility]  [phone]   [phone]        OUTPATIENT PHYSICAL THERAPY:Initial Assessment 2020   ICD-10: Treatment Diagnosis: Pain in left knee (M25.562)   Pain in right knee (M25.561)    Stiffness of left knee, not elsewhere classified (M25.662)  Stiffness of right knee, not elsewhere classified (M25.661)  Difficulty in walking, not elsewhere classified (R26.2)  Muscle weakness (generalized) (M62.81)  Precautions/Allergies:   Patient has no known allergies. TREATMENT PLAN:  Effective Dates/Frequency/Duration: Once per week from 2020 until 2021 (7 weeks). MEDICAL/REFERRING DIAGNOSIS:  Unilateral primary osteoarthritis, left knee [M17.12]  Unilateral primary osteoarthritis, right knee [M17.11]   DATE OF ONSET: 6-7 months prior to initial evaluation  REFERRING PHYSICIAN: Antolin Pelayo MD MD Orders: Evaluate and treat  Return MD Appointment: late 2020     INITIAL ASSESSMENT:  Bertin Levin is a 60 y.o. female who presents to physical therapy for slow onset of B knee pain over the past couple of years corresponding with a diagnosis of B knee osteoarthritis. Most recently, she has been having significant L knee pain secondary to increasing pressure of WBing through L LE to offload R knee (which was the knee that originally hurt) This session, pt demonstrated decreased B LE strength/endurance (L LE weaker), decreased knee mobility (left) with associated pain, multiple postural/gait deficits, decreased standing tolerance and decreased functional mobility as evident by a score of 43/80 on the Lower Extremity Functional Scale (with lower scores indicating increased disability). 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  Pt may benefit from skilled PT to address the above listed deficits to improve ability to perform pain-free ADLs/IADLs and to improve overall quality of life prior to discharge. PROBLEM LIST (Impacting functional limitations):  1. Decreased Strength  2. Decreased ADL/Functional Activities  3. Decreased Transfer Abilities  4. Decreased Ambulation Ability/Technique  5. Decreased Balance  6. Increased Pain  7. Decreased Activity Tolerance  8. Decreased Pacing Skills  9. Increased Fatigue  10. Decreased Flexibility/Joint Mobility  11. Edema/Girth INTERVENTIONS PLANNED: (Treatment may consist of any combination of the following)  1. Balance Exercise  2. Bed Mobility  3. Cold  4. Electrical Stimulation  5. Family Education  6. Gait Training  7. Heat  8. Home Exercise Program (HEP)  9. Manual Therapy  10. Neuromuscular Re-education/Strengthening  11. Range of Motion (ROM)  12. Therapeutic Activites  13. Therapeutic Exercise/Strengthening  14. Transcutaneous Electrical Nerve Stimulation (TENS)  15. Transfer Training  16. Ultrasound (US)  17. Vasopneumatic Compression     GOALS: (Goals have been discussed and agreed upon with patient.)  Discharge Goals: Time Frame: 7 weeks  1. Pt will be independent with HEP in order to increase LE strength/endurance/mobility to improve functional mobility and overall quality of life. 2. Pt will improve score on the Lower Extremity Functional Scale to 50/80 in order to demonstrate improved functional mobility and quality of life. 3. Pt will be able to ascend/descend 5 steps with modified independence with rail with reciprocal gait pattern with minimal to no increase in pain in order to improve community mobility. 4. Pt will increase bilateral hip extension strength to 4/5 with minimal to no increase in pain in order to improve ability to stand/walk for longer periods of time with less pain.     OUTCOME MEASURE:   Tool Used: Lower Extremity Functional Scale (LEFS)  Score:  Initial: 43/80 Most Recent: X/80 (Date: -- )   Interpretation of Score: 20 questions each scored on a 5 point scale with 0 representing \"extreme difficulty or unable to perform\" and 4 representing \"no difficulty\". The lower the score, the greater the functional disability. 80/80 represents no disability. Minimal detectable change is 9 points. Tool Used: Timed Up and Go (TUG)  Score:  Initial: 7.2 seconds no AD Most Recent: X seconds (Date: -- )   Interpretation of Score: The test measures, in seconds, the time taken by an individual to stand up from a standard arm chair (seat height 46 cm [18 in], arm height 65 cm [25.6 in]), walk a distance of 3 meters (118 in, approx 10 ft), turn, walk back to the chair and sit down. If the individual takes longer than 14 seconds to complete TUG, this indicates risk for falls. FALL RISK:    Ambulatory/Rehab Services H2 Model Falls Risk Assessment   Risk Factors:       No Risk Factors Identified Ability to Rise from Chair:       (1)  Pushes up, successful in one attempt   Falls Prevention Plan:       No modifications necessary   Total: (5 or greater = High Risk): 1   ©2010 [facility]. All Rights Reserved. [facility] States Patent #[id number]. Federal Law prohibits the replication, distribution or use without written permission from [facility]:     Initial assessment only today: see objective section below for details    MEDICAL NECESSITY:   · Patient is expected to demonstrate progress in strength, range of motion, balance and functional technique to reduce pain and improve ability to perform pain-free ADls/IADs and to improve ability to walk throughout her workday. REASON FOR SERVICES/OTHER COMMENTS:  · Patient continues to require modification of therapeutic interventions to increase complexity of exercises.   Total Duration:  PT Patient Time In/Time Out  Time In: 1430  Time Out: 1515    Rehabilitation Potential For Stated Goals: Good  Regarding Jerad Albert's therapy, I certify that the treatment plan above will be carried out by a therapist or under their direction. Thank you for this referral,  Francisco Mcgovern, DPT     Referring Physician Signature: Jose Boone MD _______________________________ Date _____________     PAIN/SUBJECTIVE:   Initial: 3-4/10 in L knee; 0/10 in R knee Post Session:  No specific pain level reported   HISTORY:   History of Injury/Illness (Reason for Referral): *History per pt or pt's family except where otherwise noted  Pt states she had arthritis that started getting significantly worse in her R knee several years ago (states she was getting injections and it got better), but over time her L knee started hurting as well (starting about 6-7 months ago) due to putting increased weight on it. States her whole L lower leg is more swollen now due to arthritis. Pt states it is hard to get onto and off of the floor (due to difficulty getting down on knees). States her L knee sometimes gets so swollen that it is difficult to bend her knee to put socks and pants on. States it is difficult to get her L knee to straighten out. Pt states her L knee feels stiff and painful in the morning but she moves it and puts cream on it and it gets a little better. States she can only be standing/walking for 5 hours (half of work day) before it starts to stiffen up and get painful/swollen. States by the end of her work day she can barely make it to the car. States her L knee also feels unstable when she is walking (has to wear bigger brace sometimes at work to keep her L knee stable enough to get through her work day). Pain at worst in L knee is 10/10, pain at best is 2/10.    Past Medical History/Comorbidities:   Ms. Catracho Galarza  has a past medical history of Anemia, Arthritis, Bulging of cervical intervertebral disc, Chronic pain, GERD (gastroesophageal reflux disease), History of cervical cancer (? pt unsure), Hypertension, Joint pain (9/24/2013), Menopause, and Sickle cell trait ([facility] [address] Ms. Carlos Nelson  has a past surgical history that includes hx colonoscopy; colonoscopy (N/A, 7/7/2016); colonoscopy (N/A, 12/31/2019); hx breast biopsy (Left); hx gi; hx shoulder arthroscopy (Left, 04/04/2017); hx shoulder arthroscopy (Right, 12/26/2017); hx other surgical; hx other surgical (08/2015); hx other surgical (05/07/2020); hx hysterectomy; hx tubal ligation; and hx lap cholecystectomy. 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 Current Medications:       Current Outpatient Medications:     OM-3-DHA-EPA-Fish Oil-L. Casei (Restora) 120 mg-400 mg -4 billion cell cap, One Capsule daily, Disp: 90 Cap, Rfl: 4    triamcinolone acetonide (KENALOG) 0.1 % topical cream, Apply  to affected area two (2) times a day. use thin layer, Disp: 85.2 g, Rfl: 0    montelukast (SINGULAIR) 10 mg tablet, TAKE 1 TABLET BY MOUTH EVERY DAY, Disp: 90 Tab, Rfl: 1    carvediloL (COREG) 12.5 mg tablet, Take 1 Tab by mouth two (2) times daily (with meals). , Disp: 180 Tab, Rfl: 1    potassium chloride (K-DUR, KLOR-CON) 20 mEq tablet, Take 1 Tab by mouth daily. , Disp: 90 Tab, Rfl: 1    celecoxib (CELEBREX) 200 mg capsule, Take 1 Cap by mouth two (2) times a day., Disp: 180 Cap, Rfl: 1    hydrocortisone (Proctozone-HC) 2.5 % rectal cream, Insert  into rectum four (4) times daily. , Disp: 30 g, Rfl: 1    pantoprazole (PROTONIX) 40 mg tablet, TAKE 1 TABLET BY MOUTH ONE TIME A DAY, Disp: 90 Tab, Rfl: 1    albuterol (PROVENTIL HFA, VENTOLIN HFA, PROAIR HFA) 90 mcg/actuation inhaler, , Disp: , Rfl:     Edarbyclor 40-25 mg per tablet, Take 1 Tab by mouth daily. , Disp: , Rfl:     ergocalciferol (ERGOCALCIFEROL) 1,250 mcg (50,000 unit) capsule, TAKE ONE CAPSULE BY MOUTH MONTHLY (Patient taking differently: every month. TAKE ONE CAPSULE BY MOUTH MONTHLY), Disp: 12 Cap, Rfl: 2    Pazeo 0.7 % drop, Put I drop in each eye 3 x daily (Patient taking differently: Put I drop in each eye 3 x daily as needed), Disp: 2.5 mL, Rfl: 1    fluticasone propionate (FLONASE) 50 mcg/actuation nasal spray, 1 [place] by Both Nostrils route two (2) times a day. (Patient taking differently: 1 Spray by Both Nostrils route two (2) times daily as needed.), Disp: 1 Bottle, Rfl: 3    acetaminophen (TYLENOL ARTHRITIS PAIN) 650 mg TbER, Take 650 mg by mouth three (3) times daily as needed. , Disp: , Rfl:    Date Last Reviewed:  11/16/2020    Number of Personal Factors/Comorbidities that affect the Plan of Care: 1-2: MODERATE COMPLEXITY   EXAMINATION:   Initial assessment on 11/16/2020   Observation/Orthostatic Postural Assessment:    The following postural deficits were noted in sitting: loss of cervical lordosis, increased thoracic kyphosis, forward head, rounded shoulders and posterior pelvic tilt   The following postural deficits were noted in standing: forward trunk flexion and excessive lumbar lordosis   Palpation:          Pain and tenderness surrounding L knee  ROM:    Initial measurement: (on 11/16/2020) Initial measurement: (on 11/16/2020) Reassessment measurement:  Reassessment measurement:      L knee AROM:     Knee extension: 2 degrees flexion     Knee flexion: 128 degrees  L knee PROM:     Knee extension: 0 degrees     Knee flexion: 141 degrees R knee AROM:     Knee extension: 1 degree hyperextension     Knee flexion: 140 degrees         Strength:     Initial measurement: (on 11/16/2020) Initial measurement: (on 11/16/2020)  Reassessment Reassessment    L LE: R LE:     Hip flexion  3+/5 * 4/5      Hip extension 3+/5  4-/5      Hip abduction 4/5  4/5      Hip adduction 4/5  4+/5      Hip IR  4-/5  4+/5      Hip ER 3+/5*  3+/5      Knee flexion 4-/5  4+/5      Knee extension 4/5*  5/5      Ankle DF  4-/5  4+/5      *pain in L joint line  Special Tests:          none  Neurological Screen:        Myotomes:  WFL        Dermatomes:  WFL  Functional Mobility:   Gait/Ambulation: Pt ambulates with no AD with following gait deficits: increased forward trunk lean, altered arm swing, increased lateral trunk sway, decreased trunk rotation, decreased B step length, decreased B foot clearance and antalgic gait         Transfers:  Pushes up to stand, reaches back to sit; Sheron        Bed Mobility:  WFL  Balance:          Slight lateral sway during ambulation      Body Structures Involved:  1. Nerves  2. Bones  3. Joints  4. Muscles  5. Ligaments Body Functions Affected:  1. Sensory/Pain  2. Neuromusculoskeletal  3. Movement Related Activities and Participation Affected:  1. General Tasks and Demands  2. Mobility  3. Self Care  4. Domestic Life  5. Interpersonal Interactions and Relationships  6.  Community, Social and [place]   Number of elements (examined above) that affect the Plan of Care: 4+: HIGH COMPLEXITY   CLINICAL PRESENTATION:   Presentation: Stable and uncomplicated: LOW COMPLEXITY   CLINICAL DECISION MAKING:   Use of outcome tool(s) and clinical judgement create a POC that gives a: Questionable prediction of patient's progress: MODERATE COMPLEXITY

## 2020-11-16 NOTE — PROGRESS NOTES
Raudel Santillan  : 1965  Primary: [facility]  Secondary:  Therapy Center at [address] [facility], [address], [place], [facility]  [phone]   [phone]       OUTPATIENT PHYSICAL THERAPY: Daily Treatment Note 2020  Visit Count:  1  ICD-10: Treatment Diagnosis: Pain in left knee (M25.562)   Pain in right knee (M25.561)    Stiffness of left knee, not elsewhere classified (M25.662)  Stiffness of right knee, not elsewhere classified (M25.661)  Difficulty in walking, not elsewhere classified (R26.2)  Muscle weakness (generalized) (M62.81)  Precautions/Allergies:   Patient has no known allergies. TREATMENT PLAN:  Effective Dates/Frequency/Duration: Once per week from 2020 until 2021 (7 weeks). Pre-treatment Symptoms/Complaints:  See history above. Pain: Initial:   3-4/10 in L knee, 0/10 in R knee Post Session:  No pain reported   Medications Last Reviewed:  2020  Updated Objective Findings: See evaluation note from today   TREATMENT:   Assessment only today, no treatment provided. Treatment/Session Summary:    · Response to Treatment:  See assessment above. No adverse reactions/unusual changes observed/reported in clinical status this session. · Communication/Consultation:  None today  · Equipment provided today:  None today  · Recommendations/Intent for next treatment session: Next visit will focus on developing LE strengthening/stretching HEP; manual therapy/modalities as needed to reduce pain.     Total Treatment Billable Duration:  0 minutes  PT Patient Time In/Time Out  Time In: 1430  Time Out: [facility]    Future Appointments   Date Time Provider Ursula Hoover   2020  3:15 PM Tod Plan, DPT [facility]         Visit Approval Visit # Therapist initials Date A NS Cx Comments    1  20 [x]  [] [] Initial Evaluation       [] [] []        [] [] []        [] [] [] [] [] []        [] [] []        [] [] []        [] [] []        [] [] []        [] [] []        [] [] []        [] [] []        [] [] []        [] [] []        [] [] []        [] [] []        [] [] []        [] [] []

## 2020-11-24 ENCOUNTER — HOSPITAL ENCOUNTER (OUTPATIENT)
Dept: PHYSICAL THERAPY | Age: 55
Discharge: HOME | End: 2020-11-24
Payer: COMMERCIAL

## 2020-11-24 PROCEDURE — 97110 THERAPEUTIC EXERCISES: CPT

## 2020-11-24 NOTE — PROGRESS NOTES
Shilpa Holliday  : 1965  Primary:   Secondary:  [address] at [facility]  [address] [address], [address], [place], [facility]  [phone]   [phone]       OUTPATIENT PHYSICAL THERAPY: Daily Treatment Note 2020  Visit Count:  2  ICD-10: Treatment Diagnosis: Pain in left knee (M25.562)   Pain in right knee (M25.561)    Stiffness of left knee, not elsewhere classified (M25.662)  Stiffness of right knee, not elsewhere classified (M25.661)  Difficulty in walking, not elsewhere classified (R26.2)  Muscle weakness (generalized) (M62.81)  Precautions/Allergies:   Patient has no known allergies. TREATMENT PLAN:  Effective Dates/Frequency/Duration: Once per week from 2020 until 2021 (7 weeks). Pre-treatment Symptoms/Complaints: Pt states as long as she is moving her leg doesn't hurt as much, but when she sits down for extended period of time that is when she has more problems  Pain: Initial:   2-3/10 in L LE Post Session:  010   Medications Last Reviewed:  2020  Updated Objective Findings: None Today   TREATMENT:   Therapeutic Exercise: ( 39 minutes):  Exercises per grid below to improve mobility, strength, balance and dynamic movement of leg - left to improve functional mobility. Required minimal visual, verbal and tactile cues to promote proper body alignment, promote proper body posture, promote proper body mechanics and promote proper body breathing techniques. Progressed resistance, range, repetitions and complexity of movement as indicated.     Date:  2020 Date:   Date:   Activity/Exercise Parameters Parameters    Nustep L3 resistance for 10 minutes with B LEs and UEs to increase blood flow to LEs     Ankle plantarflexor stretch at incline board 30 second hold x 3 reps with knees extended then flexed; B UE support     LAQ in sitting* 20 reps/1 set each LE with cues for slower controlled motion; reports of \"popping\" in L knee     Heel slides in supine* 20 reps/1 set L LE     SLR in supine* 10 reps/1 set L LE;  20 reps/1 set R LE; cues for slow controlled movement throughout     Bridging in hooklying* 20 reps/1 set with cues for slow motion throughout and increased hip extension AROM     Sidelying hip clamshells* 20 reps/1 set each LE with cues for avoiding posterior lumbar rotation and cues for slow eccentric movement     Sit to stands 20 reps/1 set with cues to maintain feet slightly apart and for slow controlled sit for increased musculature control; cues to avoid hip adduction                 *given in HEP  [employer] Portal   Pt given following band colors: [] Yellow          [] Red          [] Green          [] Blue          [] Grey      Treatment/Session Summary:    · Response to Treatment:  Pt reporting slight popping in L knee with LAQs this session, but otherwise no negative symptoms reported. She demonstrated good technique with minimal cues with exercises, and reported no pain at end of session. Gave pt HEP to work on until next session. Will continue to progress exercises as tolerated next session. No adverse reactions/unusual changes observed/reported in clinical status this session. · Communication/Consultation:  None today  · Equipment provided today:  None today  · Recommendations/Intent for next treatment session: Next visit will focus on developing LE strengthening/stretching HEP; manual therapy/modalities as needed to reduce pain. Total Treatment Billable Duration:  39 minutes  PT Patient Time In/Time Out  Time In: 3141  Time Out: TEREZA Cruz    No future appointments.       Visit Approval Visit # Therapist initials Date A NS Cx Comments    1  11/16/20 [x]  [] [] Initial Evaluation    2  11/24/20 [x] [] []        [] [] []        [] [] []        [] [] []        [] [] []        [] [] []        [] [] []        [] [] []        [] [] []        [] [] []        [] [] []        [] [] []        [] [] [] [] [] []        [] [] []        [] [] []        [] [] []

## 2020-12-01 ENCOUNTER — HOSPITAL ENCOUNTER (OUTPATIENT)
Dept: PHYSICAL THERAPY | Age: 55
Discharge: HOME | End: 2020-12-01
Payer: COMMERCIAL

## 2020-12-01 PROCEDURE — 97110 THERAPEUTIC EXERCISES: CPT

## 2020-12-01 NOTE — PROGRESS NOTES
Josafat Sheppard  : 1965  Primary:   Secondary:  [address] at [facility]  [address], [address], [place], [facility]  [phone]   [phone]       OUTPATIENT PHYSICAL THERAPY: Daily Treatment Note 2020  Visit Count:  3  ICD-10: Treatment Diagnosis: Pain in left knee (M25.562)   Pain in right knee (M25.561)    Stiffness of left knee, not elsewhere classified (M25.662)  Stiffness of right knee, not elsewhere classified (M25.661)  Difficulty in walking, not elsewhere classified (R26.2)  Muscle weakness (generalized) (M62.81)  Precautions/Allergies:   Patient has no known allergies. TREATMENT PLAN:  Effective Dates/Frequency/Duration: Once per week from 2020 until 2021 (7 weeks). Pre-treatment Symptoms/Complaints: Pt mistakenly arriving at 2:45 pm today (did not realize her 3:15 appointment was scheduled for tomorrow) but was able to be seen  Pain: Initial:   1/10 in L LE Post Session:  010   Medications Last Reviewed:  2020  Updated Objective Findings: None Today   TREATMENT:   Therapeutic Exercise: ( 40 minutes):  Exercises per grid below to improve mobility, strength, balance and dynamic movement of leg - left to improve functional mobility. Required minimal visual, verbal and tactile cues to promote proper body alignment, promote proper body posture, promote proper body mechanics and promote proper body breathing techniques. Progressed resistance, range, repetitions and complexity of movement as indicated.     Date:  2020 Date:  2020 Date:   Activity/Exercise Parameters Parameters    Nustep L3 resistance for 10 minutes with B LEs and UEs to increase blood flow to LEs L4 resistance for 10 minutes with B LEs and UEs to increase blood flow to LEs    Ankle plantarflexor stretch at incline board 30 second hold x 3 reps with knees extended then flexed; B UE support 30 second hold x 3 reps with knees extended then flexed; B UE support    LAQ in sitting* 20 reps/1 set each LE with cues for slower controlled motion; reports of \"popping\" in L knee 20 reps/1 set each LE with cues for slower controlled motion    Heel slides in supine* 20 reps/1 set L LE 20 reps/1 set L LE    SLR in supine* 10 reps/1 set L LE;  20 reps/1 set R LE; cues for slow controlled movement throughout 20 reps/1 set each LE with cues for slow controlled movement    Bridging in hooklying* 20 reps/1 set with cues for slow motion throughout and increased hip extension AROM 20 reps/1 set with cues for slow motion throughout and increased hip extension AROM    Sidelying hip clamshells* 20 reps/1 set each LE with cues for avoiding posterior lumbar rotation and cues for slow eccentric movement 20 reps/1 set each LE with cues for avoiding posterior lumbar rotation and cues for slow eccentric movement    Sit to stands 20 reps/1 set with cues to maintain feet slightly apart and for slow controlled sit for increased musculature control; cues to avoid hip adduction     SAQ in supine  20 reps/1 set B LEs with initial cues for correct technique    Standing heel/toe raises  20 reps/1 set each direction with light UE support throughout    Standing hip flexion  10 reps/1 set each LE with light UE support                *given in HEP  MedBridge Portal   Pt given following band colors: [] Yellow          [] Red          [] Green          [] Blue          [] Grey      Treatment/Session Summary:    · Response to Treatment:  Pt able to perform exercises with improved technique and/or increased reps this session, indicating increased LE strength/endurance. She was also able to progress to several standing exercises with no reports of increased pain. Will continue to progress exercises as pt can tolerate next session. No adverse reactions/unusual changes observed/reported in clinical status this session.   · Communication/Consultation:  None today  · Equipment provided today:  None today  · Recommendations/Intent for next treatment session: Next visit will focus on developing LE strengthening/stretching HEP; manual therapy/modalities as needed to reduce pain. Total Treatment Billable Duration:  40 minutes  PT Patient Time In/Time Out  Time In: 5384  Time Out: TEREZA Albright    No future appointments.       Visit Approval Visit # Therapist initials Date A NS Cx Comments    1  11/16/20 [x]  [] [] Initial Evaluation    2  11/24/20 [x] [] []     3  12/1/20 [x] [] []        [] [] []        [] [] []        [] [] []        [] [] []        [] [] []        [] [] []        [] [] []        [] [] []        [] [] []        [] [] []        [] [] []        [] [] []        [] [] []        [] [] []        [] [] []

## 2020-12-02 ENCOUNTER — APPOINTMENT (OUTPATIENT)
Dept: PHYSICAL THERAPY | Age: 55
End: 2020-12-02
Payer: COMMERCIAL

## 2020-12-03 NOTE — PROGRESS NOTES
I am accessing Ms. Albert's chart as a part of our department's internal chart auditing process. I certify that Ms. Devora Collado is, or was, a patient in our department.   Thank you,  Jose Mehta, PT, DPT  12/3/2020

## 2020-12-14 ENCOUNTER — HOSPITAL ENCOUNTER (EMERGENCY)
Age: 55
Discharge: LEFT BEFORE DISCHARGE | End: 2020-12-14
Attending: EMERGENCY MEDICINE
Payer: COMMERCIAL

## 2020-12-14 PROCEDURE — 75810000275 HC EMERGENCY DEPT VISIT NO LEVEL OF CARE

## 2020-12-15 ENCOUNTER — HOSPITAL ENCOUNTER (OUTPATIENT)
Dept: PHYSICAL THERAPY | Age: 55
Discharge: HOME | End: 2020-12-15
Payer: COMMERCIAL

## 2020-12-15 PROCEDURE — 97110 THERAPEUTIC EXERCISES: CPT

## 2020-12-15 NOTE — PROGRESS NOTES
Ivy Phillips  : 1965  Primary:   Secondary:  [address] at [facility]  [address], [address], [place], [facility]  [phone]   [phone]       OUTPATIENT PHYSICAL THERAPY: Daily Treatment Note 12/15/2020  Visit Count:  4      ICD-10: Treatment Diagnosis: Pain in left knee (M25.562)   Pain in right knee (M25.561)    Stiffness of left knee, not elsewhere classified (M25.662)  Stiffness of right knee, not elsewhere classified (M25.661)  Difficulty in walking, not elsewhere classified (R26.2)  Muscle weakness (generalized) (M62.81)  Precautions/Allergies:   Patient has no known allergies. TREATMENT PLAN:  Effective Dates/Frequency/Duration: Once per week from 2020 until 2021 (7 weeks). Pre-treatment Symptoms/Complaints: Pt arriving to session at 2:15 today - states she knew about her appointment but was stuck in OR due to not having enough coverage pt states her leg hadn't been hurting as much until today (not sure what she did differently but she thinks it is partially due to the cold)  Pain: Initial:   no specific pain reported Post Session:  0/10   Medications Last Reviewed:  12/15/2020  Updated Objective Findings: None Today   TREATMENT:   Therapeutic Exercise: ( 40 minutes):  Exercises per grid below to improve mobility, strength, balance and dynamic movement of leg - left to improve functional mobility. Required minimal visual, verbal and tactile cues to promote proper body alignment, promote proper body posture, promote proper body mechanics and promote proper body breathing techniques. Progressed resistance, range, repetitions and complexity of movement as indicated.     Date:  2020 Date:  2020 Date:  12/15/2020   Activity/Exercise Parameters Parameters    Nustep L3 resistance for 10 minutes with B LEs and UEs to increase blood flow to LEs L4 resistance for 10 minutes with B LEs and UEs to increase blood flow to LEs L4 resistance for 10 minutes with B LEs and UEs to increase blood flow to LEs   Ankle plantarflexor stretch at incline board 30 second hold x 3 reps with knees extended then flexed; B UE support 30 second hold x 3 reps with knees extended then flexed; B UE support 30 second hold x 3 reps with knees extended then flexed; B UE support   LAQ in sitting* 20 reps/1 set each LE with cues for slower controlled motion; reports of \"popping\" in L knee 20 reps/1 set each LE with cues for slower controlled motion    Heel slides in supine* 20 reps/1 set L LE 20 reps/1 set L LE 20 reps/1 set each LE   SLR in supine* 10 reps/1 set L LE;  20 reps/1 set R LE; cues for slow controlled movement throughout 20 reps/1 set each LE with cues for slow controlled movement    Bridging in hooklying* 20 reps/1 set with cues for slow motion throughout and increased hip extension AROM 20 reps/1 set with cues for slow motion throughout and increased hip extension AROM    Sidelying hip clamshells* 20 reps/1 set each LE with cues for avoiding posterior lumbar rotation and cues for slow eccentric movement 20 reps/1 set each LE with cues for avoiding posterior lumbar rotation and cues for slow eccentric movement    Sit to stands 20 reps/1 set with cues to maintain feet slightly apart and for slow controlled sit for increased musculature control; cues to avoid hip adduction     SAQ in supine  20 reps/1 set B LEs with initial cues for correct technique 2 lb; 20 reps/1 set each LE with initial cues for correct technique   Standing heel/toe raises  20 reps/1 set each direction with light UE support throughout 20 reps/1 set each direction with light UE support throughout   Standing hip flexion  10 reps/1 set each LE with light UE support 20 reps/1 set each LE with light UE support; cues to activate glutes and avoid excessive trunk motion by limiting ROM   Standing hip abduction   20 reps/1 set each LE with cues to avoid ER at hip and to avoid excessive ROM; light UE support   Standing hip extension   20 reps/1 set each LE with cues for upright posture and increased activation of glutes   Leg press machine   25 lb resistance; 20 reps/1 set with cues to avoid hyperextension at knees and to lower slowly for eccentric control               *given in HEP  MedBridge Portal   Pt given following band colors: [] Yellow          [] Red          [] Green          [] Blue          [] Grey      Treatment/Session Summary:    · Response to Treatment:  Pt able to perform more standing exercises this session with no specific reports of increased pain, but did demonstrate increased gait deficits at end of session (pt states due to stiffness). No adverse reactions/unusual changes observed/reported in clinical status this session. · Communication/Consultation:  None today  · Equipment provided today:  None today  · Recommendations/Intent for next treatment session: Next visit will focus on developing LE strengthening/stretching HEP; manual therapy/modalities as needed to reduce pain.     Total Treatment Billable Duration:  40 minutes  PT Patient Time In/Time Out  Time In: 7165  Time Out: [address] [facility]    Future Appointments   Date Time Provider Ursula Hoover   12/22/2020  3:15 PM Judy Drake DPT [facility]   12/29/2020  3:15 PM Judy Drake DPT [facility]         Visit Approval Visit # Therapist initials Date A NS Cx Comments    1  11/16/20 [x]  [] [] Initial Evaluation    2  11/24/20 [x] [] []     3  12/1/20 [x] [] []       12/8/2020 [] [x] []     4  12/15/20 [x] [] []        [] [] []        [] [] []        [] [] []        [] [] []        [] [] []        [] [] []        [] [] []        [] [] []        [] [] []        [] [] []        [] [] []        [] [] []        [] [] []

## 2020-12-22 ENCOUNTER — HOSPITAL ENCOUNTER (OUTPATIENT)
Dept: PHYSICAL THERAPY | Age: 55
Discharge: HOME | End: 2020-12-22
Payer: COMMERCIAL

## 2020-12-22 PROCEDURE — 97110 THERAPEUTIC EXERCISES: CPT

## 2020-12-22 NOTE — PROGRESS NOTES
Teja Cali  : 1965  Primary:   Secondary:  [address], [place], [address] [facility]  [phone]   [phone]       OUTPATIENT PHYSICAL THERAPY: Daily Treatment Note 2020  Visit Count:  5      ICD-10: Treatment Diagnosis: Pain in left knee (M25.562)   Pain in right knee (M25.561)    Stiffness of left knee, not elsewhere classified (M25.662)  Stiffness of right knee, not elsewhere classified (M25.661)  Difficulty in walking, not elsewhere classified (R26.2)  Muscle weakness (generalized) (M62.81)  Precautions/Allergies:   Patient has no known allergies. TREATMENT PLAN:  Effective Dates/Frequency/Duration: Once per week from 2020 until 2021 (7 weeks). Pre-treatment Symptoms/Complaints: Pt arriving to session early today (1:45 pm) since she got off work early - states she is okay with coming back to be seen but just thought she would see if we could take her earlier  Pain: Initial:   no specific pain reported Post Session:  010   Medications Last Reviewed:  2020  Updated Objective Findings: None Today   TREATMENT:   Therapeutic Exercise: ( 40 minutes):  Exercises per grid below to improve mobility, strength, balance and dynamic movement of leg - left to improve functional mobility. Required minimal visual, verbal and tactile cues to promote proper body alignment, promote proper body posture, promote proper body mechanics and promote proper body breathing techniques. Progressed resistance, range, repetitions and complexity of movement as indicated.     Date:  2020 Date:  12/15/2020 Date:  2020   Activity/Exercise Parameters     Nustep L4 resistance for 10 minutes with B LEs and UEs to increase blood flow to LEs L4 resistance for 10 minutes with B LEs and UEs to increase blood flow to LEs L3 resistance for 10 minutes with B LEs and UEs to increase blood flow to LEs   Ankle plantarflexor stretch at incline board 30 second hold x 3 reps with knees extended then flexed; B UE support 30 second hold x 3 reps with knees extended then flexed; B UE support 30 second hold x 3 reps with knees extended then flexed; B UE support   LAQ in sitting* 20 reps/1 set each LE with cues for slower controlled motion  20 reps/1 set each LE with cues for slower controlled motion   Heel slides in supine* 20 reps/1 set L LE 20 reps/1 set each LE 20 reps/1 set each LE   SLR in supine* 20 reps/1 set each LE with cues for slow controlled movement     Bridging in hooklying* 20 reps/1 set with cues for slow motion throughout and increased hip extension AROM  20 reps/1 set with cues for slow motion throughout and increased hip extension AROM; performed with exercise ball underneath LEs for increased challenge to core   Sidelying hip clamshells* 20 reps/1 set each LE with cues for avoiding posterior lumbar rotation and cues for slow eccentric movement  20 reps/1 set each LE with cues for avoiding posterior lumbar rotation and cues for slow eccentric movement   Sit to stands      SAQ in supine 20 reps/1 set B LEs with initial cues for correct technique 2 lb; 20 reps/1 set each LE with initial cues for correct technique 3 lb; 20 reps/1 set each LE with initial cues for correct technique   Standing heel/toe raises 20 reps/1 set each direction with light UE support throughout 20 reps/1 set each direction with light UE support throughout 20 reps/1 set each direction with light UE support throughout   Standing hip flexion 10 reps/1 set each LE with light UE support 20 reps/1 set each LE with light UE support; cues to activate glutes and avoid excessive trunk motion by limiting ROM    Standing hip abduction  20 reps/1 set each LE with cues to avoid ER at hip and to avoid excessive ROM; light UE support    Standing hip extension  20 reps/1 set each LE with cues for upright posture and increased activation of glutes    Leg press machine  25 lb resistance; 20 reps/1 set with cues to avoid hyperextension at knees and to lower slowly for eccentric control 30 lb resistance; 20 reps/1 set with cues to avoid hyperextension at knees and to lower slowly for eccentric control               *given in HEP  MedBridge Portal   Pt given following band colors: [] Yellow          [] Red          [] Green          [] Blue          [] Grey      Treatment/Session Summary:    · Response to Treatment:  Pt progressed through all LE exercises this session with no reports of increased pain, despite slight increase in challenge with several exercises. No adverse reactions/unusual changes observed/reported in clinical status this session. · Communication/Consultation:  None today  · Equipment provided today:  None today  · Recommendations/Intent for next treatment session: Next visit will focus on developing LE strengthening/stretching HEP; manual therapy/modalities as needed to reduce pain.     Total Treatment Billable Duration:  40 minutes  PT Patient Time In/Time Out  Time In: 8018  Time Out: Via Fady Muir DPT    Future Appointments   Date Time Provider Ursula Berumeni   12/29/2020  3:15 PM Allan Beebe DPT [facility]         Visit Approval Visit # Therapist initials Date A NS Cx Comments    1  11/16/20 [x]  [] [] Initial Evaluation    2  11/24/20 [x] [] []     3  12/1/20 [x] [] []       12/8/2020 [] [x] []     4  12/15/20 [x] [] []     5  12/22/20 [x] [] []        [] [] []        [] [] []        [] [] []        [] [] []        [] [] []        [] [] []        [] [] []        [] [] []        [] [] []        [] [] []        [] [] []        [] [] []

## 2020-12-29 ENCOUNTER — HOSPITAL ENCOUNTER (OUTPATIENT)
Dept: PHYSICAL THERAPY | Age: 55
Discharge: HOME | End: 2020-12-29
Payer: COMMERCIAL

## 2020-12-29 NOTE — PROGRESS NOTES
Sara Boucher  : 1965  Primary:   Secondary:  [address] at [address] [facility] [address], [address], [place], [address] [facility]  [phone]   [phone]       OUTPATIENT PHYSICAL THERAPY: Daily Treatment Note 2020  Visit Count:  6      CANCELLATION NOTE  Pt was a cancellation for physical therapy appointment today due to having to take care of her daughter. Please disregard below information as it is inapplicable to today's note. # Recent No Shows/Same-Day Cancellations: 2        ICD-10: Treatment Diagnosis: Pain in left knee (M25.562)   Pain in right knee (M25.561)    Stiffness of left knee, not elsewhere classified (M25.662)  Stiffness of right knee, not elsewhere classified (M25.661)  Difficulty in walking, not elsewhere classified (R26.2)  Muscle weakness (generalized) (M62.81)  Precautions/Allergies:   Patient has no known allergies. TREATMENT PLAN:  Effective Dates/Frequency/Duration: Once per week from 2020 until 2021 (7 weeks). Pre-treatment Symptoms/Complaints: Pt arriving to session early today (1:45 pm) since she got off work early - states she is okay with coming back to be seen but just thought she would see if we could take her earlier  Pain: Initial:   no specific pain reported Post Session:  0/10   Medications Last Reviewed:  2020  Updated Objective Findings: None Today   TREATMENT:   Therapeutic Exercise: ( 40 minutes):  Exercises per grid below to improve mobility, strength, balance and dynamic movement of leg - left to improve functional mobility. Required minimal visual, verbal and tactile cues to promote proper body alignment, promote proper body posture, promote proper body mechanics and promote proper body breathing techniques. Progressed resistance, range, repetitions and complexity of movement as indicated.     Date:  2020 Date:  12/15/2020 Date:  2020   Activity/Exercise Parameters     Nustep L4 resistance for 10 minutes with B LEs and UEs to increase blood flow to LEs L4 resistance for 10 minutes with B LEs and UEs to increase blood flow to LEs L3 resistance for 10 minutes with B LEs and UEs to increase blood flow to LEs   Ankle plantarflexor stretch at incline board 30 second hold x 3 reps with knees extended then flexed; B UE support 30 second hold x 3 reps with knees extended then flexed; B UE support 30 second hold x 3 reps with knees extended then flexed; B UE support   LAQ in sitting* 20 reps/1 set each LE with cues for slower controlled motion  20 reps/1 set each LE with cues for slower controlled motion   Heel slides in supine* 20 reps/1 set L LE 20 reps/1 set each LE 20 reps/1 set each LE   SLR in supine* 20 reps/1 set each LE with cues for slow controlled movement     Bridging in hooklying* 20 reps/1 set with cues for slow motion throughout and increased hip extension AROM  20 reps/1 set with cues for slow motion throughout and increased hip extension AROM; performed with exercise ball underneath LEs for increased challenge to core   Sidelying hip clamshells* 20 reps/1 set each LE with cues for avoiding posterior lumbar rotation and cues for slow eccentric movement  20 reps/1 set each LE with cues for avoiding posterior lumbar rotation and cues for slow eccentric movement   Sit to stands      SAQ in supine 20 reps/1 set B LEs with initial cues for correct technique 2 lb; 20 reps/1 set each LE with initial cues for correct technique 3 lb; 20 reps/1 set each LE with initial cues for correct technique   Standing heel/toe raises 20 reps/1 set each direction with light UE support throughout 20 reps/1 set each direction with light UE support throughout 20 reps/1 set each direction with light UE support throughout   Standing hip flexion 10 reps/1 set each LE with light UE support 20 reps/1 set each LE with light UE support; cues to activate glutes and avoid excessive trunk motion by limiting ROM    Standing hip abduction  20 reps/1 set each LE with cues to avoid ER at hip and to avoid excessive ROM; light UE support    Standing hip extension  20 reps/1 set each LE with cues for upright posture and increased activation of glutes    Leg press machine  25 lb resistance; 20 reps/1 set with cues to avoid hyperextension at knees and to lower slowly for eccentric control 30 lb resistance; 20 reps/1 set with cues to avoid hyperextension at knees and to lower slowly for eccentric control               *given in HEP  MedBridge Portal   Pt given following band colors: [] Yellow          [] Red          [] Green          [] Blue          [] Grey      Treatment/Session Summary:    · Response to Treatment:  Pt progressed through all LE exercises this session with no reports of increased pain, despite slight increase in challenge with several exercises. No adverse reactions/unusual changes observed/reported in clinical status this session. · Communication/Consultation:  None today  · Equipment provided today:  None today  · Recommendations/Intent for next treatment session: Next visit will focus on developing LE strengthening/stretching HEP; manual therapy/modalities as needed to reduce pain.     Total Treatment Billable Duration:  36 minutes     Stacy Gutierrez DPT    Future Appointments   Date Time Provider Ursula Hoover   12/29/2020  3:15 PM Agus Connor DPT [facility]   1/5/2021  3:15 PM Agus Connor DPT [facility]         Visit Approval Visit # Therapist initials Date A NS Cx Comments    1  11/16/20 [x]  [] [] Initial Evaluation    2  11/24/20 [x] [] []     3  12/1/20 [x] [] []       12/8/2020 [] [x] []     4  12/15/20 [x] [] []     5  12/22/20 [x] [] []       12/29/20 [] [] [x] <24 cancellation       [] [] []        [] [] []        [] [] []        [] [] []        [] [] []        [] [] []        [] [] []        [] [] []        [] [] []        [] [] []        [] [] []

## 2021-01-05 ENCOUNTER — HOSPITAL ENCOUNTER (OUTPATIENT)
Dept: PHYSICAL THERAPY | Age: 56
Discharge: HOME | End: 2021-01-05
Payer: COMMERCIAL

## 2021-01-05 PROCEDURE — 97110 THERAPEUTIC EXERCISES: CPT

## 2021-01-05 NOTE — THERAPY DISCHARGE
Sergey Kaur  : 1965  Primary: [facility]  Secondary:  [facility] [address]", [place] [address] [address] [facility]  [phone]   [phone]        OUTPATIENT PHYSICAL THERAPY:Discharge Summary 2021   ICD-10: Treatment Diagnosis: Pain in left knee (M25.562)   Pain in right knee (M25.561)    Stiffness of left knee, not elsewhere classified (M25.662)  Stiffness of right knee, not elsewhere classified (M25.661)  Difficulty in walking, not elsewhere classified (R26.2)  Muscle weakness (generalized) (M62.81)  Precautions/Allergies:   Patient has no known allergies. TREATMENT PLAN:  Effective Dates/Frequency/Duration: Once per week from 2020 until 2021 (7 weeks). MEDICAL/REFERRING DIAGNOSIS:  Unilateral primary osteoarthritis, left knee [M17.12]  Unilateral primary osteoarthritis, right knee [M17.11]   DATE OF ONSET: 6-7 months prior to initial evaluation  REFERRING PHYSICIAN: Lin Sandifer, MD MD Orders: Evaluate and treat  Return MD Appointment: late 2020     ASSESSMENT:  Sergey Kaur has now attended 6 physical therapy sessions for slow onset of B knee pain over the past couple of years corresponding with a diagnosis of B knee osteoarthritis. During her time in therapy, she has met 3 out of 4 of her goals (one goal has not been reached due to slow process of strengthening). Despite her not having reached all of her goals, pt requesting discharge today due to wanting to try exercises on her own for a little while. Will discharge pt from physical therapy at this time. PROBLEM LIST (Impacting functional limitations):  1. Decreased Strength  2. Decreased ADL/Functional Activities  3. Decreased Transfer Abilities  4. Decreased Ambulation Ability/Technique  5. Decreased Balance  6. Increased Pain  7. Decreased Activity Tolerance  8. Decreased Pacing Skills  9. Increased Fatigue  10.  Decreased Flexibility/Joint Mobility  11. Edema/Girth INTERVENTIONS PLANNED: (Treatment may consist of any combination of the following)  1. Balance Exercise  2. Bed Mobility  3. Cold  4. Electrical Stimulation  5. Family Education  6. Gait Training  7. Heat  8. Home Exercise Program (HEP)  9. Manual Therapy  10. Neuromuscular Re-education/Strengthening  11. Range of Motion (ROM)  12. Therapeutic Activites  13. Therapeutic Exercise/Strengthening  14. Transcutaneous Electrical Nerve Stimulation (TENS)  15. Transfer Training  16. Ultrasound (US)  17. Vasopneumatic Compression     GOALS: (Goals have been discussed and agreed upon with patient.)  Discharge Goals: Time Frame: 7 weeks  1. Pt will be independent with HEP in order to increase LE strength/endurance/mobility to improve functional mobility and overall quality of life. (MET, 1/5/2021)   2. Pt will improve score on the Lower Extremity Functional Scale to 50/80 in order to demonstrate improved functional mobility and quality of life. (MET, 1/5/2021)   3. Pt will be able to ascend/descend 5 steps with modified independence with rail with reciprocal gait pattern with minimal to no increase in pain in order to improve community mobility. (MET, 1/5/2021)   4. Pt will increase bilateral hip extension strength to 4/5 with minimal to no increase in pain in order to improve ability to stand/walk for longer periods of time with less pain. (NOT MET, 1/5/2021)     OUTCOME MEASURE:   Tool Used: Lower Extremity Functional Scale (LEFS)  Score:  Initial: 43/80 Most Recent: 59/80 (Date: 1/5/2021 )   Interpretation of Score: 20 questions each scored on a 5 point scale with 0 representing \"extreme difficulty or unable to perform\" and 4 representing \"no difficulty\". The lower the score, the greater the functional disability. 80/80 represents no disability. Minimal detectable change is 9 points.      Tool Used: Timed Up and Go (TUG)  Score:  Initial: 7.2 seconds no AD Most Recent: X seconds (Date: -- )   Interpretation of Score: The test measures, in seconds, the time taken by an individual to stand up from a standard arm chair (seat height 46 cm [18 in], arm height 65 cm [25.6 in]), walk a distance of 3 meters (118 in, approx 10 ft), turn, walk back to the chair and sit down. If the individual takes longer than 14 seconds to complete TUG, this indicates risk for falls. FALL RISK:    Ambulatory/Rehab Services H2 Model Falls Risk Assessment   Risk Factors:       No Risk Factors Identified Ability to Rise from Chair:       (1)  Pushes up, successful in one attempt   Falls Prevention Plan:       No modifications necessary   Total: (5 or greater = High Risk): 1   ©2010 [facility] [employer]. All Rights Reserved. [facility] "[employer] Patent #[id number].  Federal Law prohibits the replication, distribution or use without written permission from [address] FINDINGS:     Reassessment on 1/5/2021   Observation/Orthostatic Postural Assessment:    The following postural deficits were noted in sitting: loss of cervical lordosis, increased thoracic kyphosis, forward head, rounded shoulders and posterior pelvic tilt - slightly improved posture on 1/5/2021  The following postural deficits were noted in standing: forward trunk flexion and excessive lumbar lordosis - less lumbar lordosis noted on 1/5/2021  Palpation:          Pain and tenderness surrounding L knee  ROM:    Initial measurement: (on 11/16/2020) Initial measurement: (on 11/16/2020) Reassessment measurement:  Reassessment measurement:      L knee AROM:     Knee extension: 2 degrees flexion     Knee flexion: 128 degrees  L knee PROM:     Knee extension: 0 degrees     Knee flexion: 141 degrees R knee AROM:     Knee extension: 1 degree hyperextension     Knee flexion: 140 degrees         Strength:     Initial measurement: (on 11/16/2020) Initial measurement: (on 11/16/2020)  Reassessment (on 1/5/2021) Reassessment (on 1/5/2021)    L LE: R LE: L LE: R LE:   Hip flexion  3+/5 * 4/5  4/5 4/5   Hip extension 3+/5  4-/5  4/5 4-/5   Hip abduction 4/5  4/5  4+/5 4+/5   Hip adduction 4/5  4+/5  4-/5 4+/5   Hip IR  4-/5  4+/5  4+/5 4+/5   Hip ER 3+/5*  3+/5  4-/5 4-/5   Knee flexion 4-/5  4+/5  4+/5 4+/5   Knee extension 4/5*  5/5  5/5 5/5   Ankle DF  4-/5  4+/5  4+/5 4+/5   *pain in L joint line  Functional Mobility:   Gait/Ambulation: Pt ambulates with no AD with only slight forward trunk lean but no other significant gait deficits on 1/5/2021        Transfers:  Pushes up to stand, reaches back to sit; Sheron        Bed Mobility:  WFL  Balance:          Good    Thank you for this referral,  Yesenia Mahmood DPT     Referring Physician Signature: Katrina Kawasaki, MD No Signature is Required for this note.

## 2021-01-05 NOTE — PROGRESS NOTES
Cherrie Lawson  : 1965  Primary:   Secondary:  [address] at [facility]  [place] [address], [place], [facility] [address] [facility]  [phone]   [phone]       OUTPATIENT PHYSICAL THERAPY: Daily Treatment Note 2021  Visit Count:  6          ICD-10: Treatment Diagnosis: Pain in left knee (M25.562)   Pain in right knee (M25.561)    Stiffness of left knee, not elsewhere classified (M25.662)  Stiffness of right knee, not elsewhere classified (M25.661)  Difficulty in walking, not elsewhere classified (R26.2)  Muscle weakness (generalized) (M62.81)  Precautions/Allergies:   Patient has no known allergies. TREATMENT PLAN:  Effective Dates/Frequency/Duration: Once per week from 2020 until 2021 (7 weeks). Pre-treatment Symptoms/Complaints: Pt states she has been feeling better recently (she had gel injections in B knees at beginning of 2020); pt states she doesn't have the pain as much as she originally did but still feels like her L knee is unstable; pt states she would like to discharge today and try exercises on her own for a little while  Pain: Initial:   no specific pain reported Post Session:  0/10   Medications Last Reviewed:  2021  Updated Objective Findings: See evaluation note from today   TREATMENT:   Therapeutic Exercise: ( 40 minutes):  Exercises per grid below (and assessment in chart on 2021) to improve mobility, strength, balance and dynamic movement of leg - left to improve functional mobility. Required minimal visual, verbal and tactile cues to promote proper body alignment, promote proper body posture, promote proper body mechanics and promote proper body breathing techniques. Progressed resistance, range, repetitions and complexity of movement as indicated.     Date:  12/15/2020 Date:  2020 Date:  2021   Activity/Exercise      Nustep L4 resistance for 10 minutes with B LEs and UEs to increase blood flow to LEs L3 resistance for 10 minutes with B LEs and UEs to increase blood flow to LEs L3 resistance for 10 minutes with B LEs and UEs to increase blood flow to LEs   Ankle plantarflexor stretch at incline board 30 second hold x 3 reps with knees extended then flexed; B UE support 30 second hold x 3 reps with knees extended then flexed; B UE support 30 second hold x 3 reps with knees extended then flexed; B UE support   LAQ in sitting*  20 reps/1 set each LE with cues for slower controlled motion    Heel slides in supine* 20 reps/1 set each LE 20 reps/1 set each LE    SLR in supine*      Bridging in hooklying*  20 reps/1 set with cues for slow motion throughout and increased hip extension AROM; performed with exercise ball underneath LEs for increased challenge to core    Sidelying hip clamshells*  20 reps/1 set each LE with cues for avoiding posterior lumbar rotation and cues for slow eccentric movement    Sit to stands      SAQ in supine 2 lb; 20 reps/1 set each LE with initial cues for correct technique 3 lb; 20 reps/1 set each LE with initial cues for correct technique    Standing heel/toe raises 20 reps/1 set each direction with light UE support throughout 20 reps/1 set each direction with light UE support throughout    Standing hip flexion 20 reps/1 set each LE with light UE support; cues to activate glutes and avoid excessive trunk motion by limiting ROM     Standing hip abduction 20 reps/1 set each LE with cues to avoid ER at hip and to avoid excessive ROM; light UE support     Standing hip extension 20 reps/1 set each LE with cues for upright posture and increased activation of glutes     Leg press machine 25 lb resistance; 20 reps/1 set with cues to avoid hyperextension at knees and to lower slowly for eccentric control 30 lb resistance; 20 reps/1 set with cues to avoid hyperextension at knees and to lower slowly for eccentric control    Sidelying hip abduction*   20 reps/1 set each LE with cues for slow controlled  movement throughout   Prone hip extension*   15 reps/1 set each LE with cues for slow movement and avoiding rotation at lumbar spine   Ascending/descending steps   6-8 steps x 3 bouts with reciprocal step gait and no rail use; independent   Varying resistance at each LE in multiple different planes of motion   Per strength assessment in chart         *given in HEP  [employer] Portal   Pt given following band colors: [] Yellow          [] Red          [] Birder Rosyll          [] Blue          [] Grey      Treatment/Session Summary:    · Response to Treatment:  Discharge note today - see assessment in chart for details        No adverse reactions/unusual changes observed/reported in clinical status this session. · Communication/Consultation:  None today  · Equipment provided today:  None today    Total Treatment Billable Duration:  40 minutes  PT Patient Time In/Time Out  Time In: 1512  Time Out: [address], [place]    No future appointments.       Visit Approval Visit # Therapist initials Date A NS Cx Comments    1  11/16/20 [x]  [] [] Initial Evaluation    2  11/24/20 [x] [] []     3  12/1/20 [x] [] []       12/8/2020 [] [x] []     4  12/15/20 [x] [] []     5  12/22/20 [x] [] []       12/29/20 [] [] [x] <24 cancellation    6  1/5/2021 [x] [] []        [] [] []        [] [] []        [] [] []        [] [] []        [] [] []        [] [] []        [] [] []        [] [] []        [] [] []        [] [] []

## 2021-03-24 ENCOUNTER — APPOINTMENT (OUTPATIENT)
Dept: GENERAL RADIOLOGY | Age: 56
End: 2021-03-24
Attending: EMERGENCY MEDICINE
Payer: COMMERCIAL

## 2021-03-24 ENCOUNTER — HOSPITAL ENCOUNTER (EMERGENCY)
Age: 56
Discharge: HOME | End: 2021-03-24
Attending: EMERGENCY MEDICINE
Payer: COMMERCIAL

## 2021-03-24 VITALS
RESPIRATION RATE: 16 BRPM | HEIGHT: 69 IN | DIASTOLIC BLOOD PRESSURE: 89 MMHG | WEIGHT: 176 LBS | SYSTOLIC BLOOD PRESSURE: 152 MMHG | HEART RATE: 62 BPM | TEMPERATURE: 98.7 F | OXYGEN SATURATION: 99 % | BODY MASS INDEX: 26.07 KG/M2

## 2021-03-24 DIAGNOSIS — M54.41 ACUTE RIGHT-SIDED LOW BACK PAIN WITH RIGHT-SIDED SCIATICA: Primary | ICD-10-CM

## 2021-03-24 DIAGNOSIS — M43.10 ANTEROLISTHESIS: ICD-10-CM

## 2021-03-24 PROCEDURE — 96372 THER/PROPH/DIAG INJ SC/IM: CPT

## 2021-03-24 PROCEDURE — 99283 EMERGENCY DEPT VISIT LOW MDM: CPT

## 2021-03-24 PROCEDURE — 74011250636 HC RX REV CODE- 250/636: Performed by: PHYSICIAN ASSISTANT

## 2021-03-24 PROCEDURE — 72100 X-RAY EXAM L-S SPINE 2/3 VWS: CPT

## 2021-03-24 RX ORDER — KETOROLAC TROMETHAMINE 30 MG/ML
30 INJECTION, SOLUTION INTRAMUSCULAR; INTRAVENOUS
Status: COMPLETED | OUTPATIENT
Start: 2021-03-24 | End: 2021-03-24

## 2021-03-24 RX ORDER — METHOCARBAMOL 500 MG/1
500 TABLET, FILM COATED ORAL 4 TIMES DAILY
Qty: 28 TAB | Refills: 0 | Status: SHIPPED | OUTPATIENT
Start: 2021-03-24 | End: 2021-03-31

## 2021-03-24 RX ADMIN — KETOROLAC TROMETHAMINE 30 MG: 30 INJECTION, SOLUTION INTRAMUSCULAR at 14:08

## 2021-03-24 NOTE — ED NOTES
I have reviewed discharge instructions with the patient. The patient verbalized understanding. Patient left ED via Discharge Method: ambulatory to Home with self  Opportunity for questions and clarification provided. Patient given 0 scripts. No e-sign        To continue your aftercare when you leave the hospital, you may receive an automated call from our care team to check in on how you are doing. This is a free service and part of our promise to provide the best care and service to meet your aftercare needs.  If you have questions, or wish to unsubscribe from this service please call [phone]. Thank you for Choosing our [employer] [facility] Emergency Department.

## 2021-03-24 NOTE — ED TRIAGE NOTES
Patient ambulatory to triage with mask in place. Patient reports right hip pain. Pt reports she has been working on it for 6 weeks. Pt reports she had an extreme back pain today and her right leg went numb. Pt reports she felt like she was about to faint. Pt reports her PCP gave her steroid shot and muscle relaxer. Pt also reports taking extra strength tylenol with minimal relief. Pt denies any injury or trauma.

## 2021-03-24 NOTE — Clinical Note
[facility] EMERGENCY DEPT  [address]  [place] [facility] [address]  [phone]    Work/School Note    Date: 3/24/2021    To Whom It May concern:    Hanny Toro was seen and treated today in the emergency room by the following provider(s):  Attending Provider: Meera Camarena MD  Physician Assistant: Thomas Reagan. Hanny Toro is excused from work/school on 3/24/2021 through 3/27/2021. She is medically clear to return to work/school on 3/28/2021.         Sincerely,          THIEN Patel

## 2021-03-24 NOTE — ED PROVIDER NOTES
Patient is a 41-year-old female with past medical history as hypertension who presents with complaint of right leg pain. Patient has had ongoing symptoms in the right hip and leg for the past 5 weeks. She has seen her PCP regarding the issue who thought that maybe she had bursitis and last Friday was given a joint injection in the right hip and started on a course of steroids. She states that nothing has really helped the pain and has been getting slowly worse. Pain is located in the back of the hip and shoots down the back of the leg to the foot. It is associated with numbness and tingling. Patient works in the [employer] and is on her feet all day. Today while at work she felt a sharp pain in her right lower back that shot down into the foot and her legs felt numb. Patient states the pain was so intense that she almost passed out. She came down to the ER for evaluation. She denies any loss of bowel or bladder. She rates her pain as a 9 out of 10. She took 20 mg of prednisone this morning and extra strength Tylenol and 530 before coming to work. The history is provided by the patient. Back Pain   Associated symptoms include numbness (Intermittent numbness and tingling sensation down the right leg). Pertinent negatives include no chest pain, no fever, no abdominal pain and no dysuria.         Past Medical History:   Diagnosis Date    Anemia     hx of taking oral iron- resolved- denies hx of blood transfusions    Arthritis     Bulging of cervical intervertebral disc     Chronic pain     right shoulder     GERD (gastroesophageal reflux disease)     well controlled with med    History of cervical cancer ? 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    HX HYSTERECTOMY      HX LAP CHOLECYSTECTOMY      HX OTHER SURGICAL      ganglion cyst right wrist    HX OTHER SURGICAL  2015    [facility]    HX OTHER SURGICAL  2020    [facility]    HX SHOULDER ARTHROSCOPY Left 2017    HX SHOULDER ARTHROSCOPY Right 2017    HX TUBAL LIGATION           Family History:   Problem Relation Age of Onset    Sickle Cell Anemia Mother     No Known Problems Father     Cancer Maternal Grandmother         colon cancer    Diabetes Maternal Grandmother     Heart Attack Other         unknown grandparent    Breast Cancer Neg Hx     Ovarian Cancer Neg Hx     Uterine Cancer Neg Hx        Social History     Socioeconomic History    Marital status:      Spouse name: Not on file    Number of children: Not on file    Years of education: Not on file    Highest education level: Not on file   Occupational History    Not on file   Social Needs    Financial resource strain: Not on file    Food insecurity     Worry: Not on file     Inability: Not on file    Transportation needs     Medical: Not on file     Non-medical: Not on file   Tobacco Use    Smoking status: Former Smoker     Packs/day: 0.50     Years: 12.00     Pack years: 6.00     Quit date: 2007     Years since quittin.3    Smokeless tobacco: Never Used   Substance and Sexual Activity    Alcohol use: No    Drug use: No    Sexual activity: Not Currently   Lifestyle    Physical activity     Days per week: Not on file     Minutes per session: Not on file    Stress: Not on file   Relationships    Social connections     Talks on phone: Not on file     Gets together: Not on file     Attends Sikh service: Not on file     Active member of club or organization: Not on file     Attends meetings of clubs or organizations: Not on file     Relationship status: Not on file    Intimate partner violence     Fear of current or ex partner: Not on file     Emotionally abused: Not on file     Physically abused: Not on file     Forced sexual activity: Not on file   Other Topics Concern    Not on file   Social History Narrative    Not on file         ALLERGIES: Patient has no known allergies. Review of Systems   Constitutional: Negative for chills, fatigue and fever. HENT: Negative for congestion and sore throat. Respiratory: Negative for cough and shortness of breath. Cardiovascular: Negative for chest pain. Gastrointestinal: Negative for abdominal pain, nausea and vomiting. Genitourinary: Negative for dysuria and flank pain. Musculoskeletal: Positive for back pain. Right leg pain   Neurological: Positive for numbness (Intermittent numbness and tingling sensation down the right leg). Negative for light-headedness. Psychiatric/Behavioral: Negative for behavioral problems. Vitals:    03/24/21 1233   BP: 122/84   Pulse: 74   Resp: 16   Temp: 98.7 °F (37.1 °C)   SpO2: 99%   Weight: 79.8 kg (176 lb)   Height: 5' 9\" (1.753 m)            Physical Exam  Vitals signs and nursing note reviewed. Constitutional:       General: She is not in acute distress. Appearance: She is not ill-appearing or toxic-appearing. HENT:      Head: Normocephalic and atraumatic. Cardiovascular:      Rate and Rhythm: Normal rate. Pulses: Normal pulses. Pulmonary:      Effort: Pulmonary effort is normal.      Breath sounds: Normal breath sounds. Musculoskeletal:      Comments: Straight leg raise positive on the right lower extremity  Strength 5/5 GIANCARLO LE  Sensation intact throughout both lower extremities  Distal pulses 2+   Skin:     General: Skin is warm and dry. Neurological:      Mental Status: She is alert and oriented to person, place, and time.    Psychiatric:         Mood and Affect: Mood normal.         Behavior: Behavior normal.         Thought Content: Thought content normal.         Judgment: Judgment normal.          MDM  Number of Diagnoses or Management Options  Acute right-sided low back pain with right-sided sciatica  Anterolisthesis  Diagnosis management comments: Pt presenting with acute onset of pain shooting through right buttocks and down the right leg, associated with tingling sensation. No loss of bowel or bladder, no weakness on exam.   XR of the lumbar spine show: Slight anterolisthesis of L4 on L5. No acute fracture is evident. Vertebral body   heights are maintained. Mild multilevel degenerative changes characterized by   intervertebral disc space narrowing and small endplate ossified development. Multilevel facet arthrosis. Discussed all results with patient. She is currently finished short course of prednisone, has 2 more doses. Will give f/u with ortho spine for further evaluation and discussed need for possible MRI in the future. Will dc with robaxin and given work note, as pt stands on her feet all day long. Pt instructed to return to the ED with any weakness, loss of bowel or bladder, saddle anesthesia or concerning symptoms. Pt verbalizes understanding and is agreeable to plan.           Procedures

## 2021-03-25 ENCOUNTER — PATIENT OUTREACH (OUTPATIENT)
Dept: OTHER | Age: 56
End: 2021-03-25

## 2021-03-25 NOTE — PROGRESS NOTES
Patient eligible for [employer] care management. Received notification of discharge from [facility] ER on 3/24/21 for back pain. Contacted patient to discuss post discharge needs and offer care management services. Two patient identifiers verified. Discussed the care management program.  Patient agrees to care management services at this time. PMH:   Past Medical History:   Diagnosis Date    Anemia     hx of taking oral iron- resolved- denies hx of blood transfusions    Arthritis     Bulging of cervical intervertebral disc     Chronic pain     right shoulder     GERD (gastroesophageal reflux disease)     well controlled with med    History of cervical cancer ? pt unsure    s/p hyst    Hypertension     managed with med    Joint pain 9/24/2013    Menopause     Sickle cell trait ([facility] [address]        59 yo female who presented to the ER with complaints of 6 week history of back and hip pain. Reports sudden onset of excruciating pain with radiation and numbness into right leg. States pain was so severe, she almost fainted. Patient was brought to ER by coworkers. Patient had seen PCP on 3/19/21 with steroid injection, also taking Arthritis strength Tylenol without relief. Work up in [place] [address] with spine xray showed No acute radiographic abnormality. Mild multilevel spondylosis and facet arthrosis. Patient was discharged home with Rx for Robaxin and advised to follow up with Orthopedic surgeon. CC spoke with patient who reports no relief in pain. Rates pain at 10+/10. Also reports continued tingling and numbness in right leg. Denies any bowel or bladder problems and no falls or difficulty ambulating. Has scheduled follow up appointment with Dr. Becky Rivera on 3/26/21 @10am. Patient is taking Robaxin and Tylenol for pain with little relief. Reports she is able to manage ADL's but has adult son in home to assist if needed. Reviewed Red Flag symptoms and when to return to ER. Patient verbalized understanding. Patient is Surgical tech. ER physician gave out of work note until 3/29/21. Will plan follow up in about 3 days. Care management assessment completed:    Condition Focused Assessment: Orthopedic Condition Focused Assessment    Skin- any open wounds or incisions? no  Have you recently had any of the following? Any recent changes in activity yes  Does patient have incentive spirometer? no  Mobility or assistive device in place no  DME needs addressed yes  PT/OT/ST ordered no  Pain rated as 10/10 to back and leg described as severe, cramp and sharp   Numbness or tingling yes  Weakness yes  Trouble with coordinating your movement, or change in gait no  New or worsening pain to calf, back of knee or groin no   Redness, swelling to leg or groin no  Fever no  Nausea yes  Vomiting no  Chills or clammy skin no  Change in urine output no  Recent change in urinary or bowel continence no  Change in speech no  Difficulty swallowing no  Dizziness/lightheadedness no  Sleep disturbance yes     Visual changes no  Medication changes? no    Red Flags: Call 911 if    You are unable to move a leg at all. Call your docotor if  · You have new or worse symptoms in your legs or buttocks. ·  Symptoms may include:  · WorseningNumbness or tingling. · Worsening Weakness. · Worsening Pain. · You lose bladder or bowel control. Medications:  New Medications at Discharge: Robaxin  Changed Medications at Discharge: none  Discontinued Medications at Discharge: none    Current Outpatient Medications   Medication Sig    methocarbamoL (Robaxin) 500 mg tablet Take 1 Tab by mouth four (4) times daily for 7 days.  cyclobenzaprine (FLEXERIL) 10 mg tablet Take 1 Tab by mouth nightly as needed for Muscle Spasm(s).  OM-3-DHA-EPA-Fish Oil-L. Casei (Restora) 120 mg-400 mg -4 billion cell cap One Capsule daily    triamcinolone acetonide (KENALOG) 0.1 % topical cream Apply  to affected area two (2) times a day.  use thin layer    montelukast (SINGULAIR) 10 mg tablet TAKE 1 TABLET BY MOUTH EVERY DAY    carvediloL (COREG) 12.5 mg tablet Take 1 Tab by mouth two (2) times daily (with meals).  potassium chloride (K-DUR, KLOR-CON) 20 mEq tablet Take 1 Tab by mouth daily.  celecoxib (CELEBREX) 200 mg capsule Take 1 Cap by mouth two (2) times a day.  hydrocortisone (Proctozone-HC) 2.5 % rectal cream Insert  into rectum four (4) times daily.  pantoprazole (PROTONIX) 40 mg tablet TAKE 1 TABLET BY MOUTH ONE TIME A DAY    albuterol (PROVENTIL HFA, VENTOLIN HFA, PROAIR HFA) 90 mcg/actuation inhaler     Edarbyclor 40-25 mg per tablet Take 1 Tab by mouth daily.  ergocalciferol (ERGOCALCIFEROL) 1,250 mcg (50,000 unit) capsule TAKE ONE CAPSULE BY MOUTH MONTHLY (Patient taking differently: every month. TAKE ONE CAPSULE BY MOUTH MONTHLY)    Pazeo 0.7 % drop Put I drop in each eye 3 x daily (Patient taking differently: Put I drop in each eye 3 x daily as needed)    fluticasone propionate (FLONASE) 50 mcg/actuation nasal spray 1 Albers by Both Nostrils route two (2) times a day. (Patient taking differently: 1 Spray by Both Nostrils route two (2) times daily as needed.)    acetaminophen (TYLENOL ARTHRITIS PAIN) 650 mg TbER Take 650 mg by mouth three (3) times daily as needed. No current facility-administered medications for this visit. Performed medication reconciliation with patient, and patient verbalizes understanding of administration of home medications. There were no barriers to obtaining medications identified at this time.     Preventive Care    Health Maintenance   Topic Date Due    Breast Cancer Screen Mammogram  11/06/2021    Lipid Screen  11/25/2024    Colorectal Cancer Screening Combo  12/31/2024    DTaP/Tdap/Td series (2 - Td) 02/03/2027    Hepatitis C Screening  Completed    Shingrix Vaccine Age 50>  Completed    Flu Vaccine  Completed    COVID-19 Vaccine  Completed    Pneumococcal 0-64 years  Aged Out       Initial Plan of Care  Goal:Demonstrates no signs of complications or red flags in 30 days;   Review and discuss importance of monitoring and reporting red flags;   Review medications and when taken with patient to ensure understanding;   Educate patient when to call the physician or 911;   Assess for any barriers with care access or mobility needs at home;  [facility] patient to immediately notify the physician if: any red flag symptoms     Pain Management   Goal:Demonstrates  pain control with rating of 6 or less with 7 days  · States pain 10+/10;  · States using her Robaxin and Tylenol with minimal control;  · Advised Using ice or heat on back  · Verbalized proper use of ice not against skin;  · States applies ice pack for 15 mins every 2 hrs;    Goal: Completes all FU appointments as ordered at DC;  · Reviewed DC instructions and FU appointments with patient;  · Verbalized appt with Orthopedic Surgeon on 03/26/21 @10am;        Barriers / Support system:  patient and son    Home health/DME in place per discharge orders:  none    Barriers/Challenges to Care: []  Decline in memory      []  Language barrier  []  Emotional  [x]  Limited mobility []  Lack of motivation     []  Knowledge [] Vision, hearing or cognitive impairment  [] Financial Barriers    []  Lack of support  [x]  Pain    []  Other    []  None    PCP/Specialist follow up:     Future Appointments   Date Time Provider Ursula Fulleristi   3/26/2021 10:00 AM MD COY Graham       Reviewed red flags with patient, and patient verbalizes understanding. Patient given an opportunity to ask questions. No other clinical/social/functional needs noted. The patient agrees to contact the PCP office for questions related to their healthcare. The patient expressed thanks, offered no additional questions and ended the call.       Plan for next call:  3/29/21

## 2021-03-29 ENCOUNTER — PATIENT OUTREACH (OUTPATIENT)
Dept: OTHER | Age: 56
End: 2021-03-29

## 2021-03-29 NOTE — PROGRESS NOTES
[facility]P Follow up:  Care Manager contacted the patient by telephone in follow up. Verified  and zip code with patient as identifiers. CC spoke with patient who reports she is continue to have pain with tingling and numbness of right leg. States no improvement, rates pain at 10/10. Denies any new or worsening symptoms. Denies falls or difficulty ambulating other than pain. No bowel or bladder symptoms. Was seen by Mia Agarwal on 3/26/21, scheduled for MRI on 3/30/21 and Nerve block on 21. States Dr Miky Song to call after MRI. Reviewed Red Flag Symptoms, patient verbalized understanding. No new questions or concerns. Assessment of clinical changes and knowledge demonstrated since last call:   Ongoing Plan of Care:   Goal:Demonstrates no signs of complications or red flags in 30 days;  · Review and discuss importance of monitoring and reporting red flags;  · Review medications and when taken with patient to ensure understanding;  · Educate patient when to call the physician or 911;  · Assess for any barriers with care access or mobility needs at home;  · Instruct patient to immediately notify the physician if: any red flag symptoms                Pain Management   Goal:Demonstrates  pain control with rating of 6 or less with 7 days  · 3/29/21 States pain 10/10;  · States using her Robaxin and Tylenol with minimal control;  · Advised Using ice or heat on back  § Verbalized proper use of ice not against skin;  § States applies ice pack for 15 mins every 2 hrs;     Goal: Completes all FU appointments as ordered at DC;  3/29/21 Scheduled for MRI on 3/30/21 and Nerve Block 4/2/21  3/29/21 Completed appointment with Mia Agarwal 3/26/21  · Reviewed DC instructions and FU appointments with patient;  · Verbalized appt with Orthopedic Surgeon on 21 @10am;       Review and discussion of plan of care with patient, who has provided input to plan, verbalized understanding and agrees with current goals.       Any recurrence Red Flags or continued symptoms:  Yes see above     Medication Regimen Change:none  Completed a review of medications with patient, who verbalized understanding of how and when to take medications. Barriers / Adherence with medications:none    Upcoming Appointments:    Future Appointments   Date Time Provider Ursula Hoover   3/30/2021  3:15 PM POA INT MRI INTMR AMB RAD SC   4/2/2021  3:45 PM Jacque Prieto MD [facility] POA       Patient asked questions appropriately and denied any additional needs at this time. Patient verbalized understanding of all information discussed. Patient has my name and contact information for any follow up needs or questions.      Plan next call: about 1 week

## 2021-04-05 ENCOUNTER — PATIENT OUTREACH (OUTPATIENT)
Dept: OTHER | Age: 56
End: 2021-04-05

## 2021-04-05 NOTE — PROGRESS NOTES
Care Manager contacted the patient by telephone in follow up. Verified  and zip code with patient as identifiers. CC spoke with patient who reports no improvement in pain. Rates pain at 10/10. Taking medication with minimal relief. Completed MRI on 3/30/21 and had KELLY on 21. Giorgi had short term relief from [facility], but pain returned and continues. Plans to call [employer] to start REJI and FMLA paperwork. Has called Dr Cooper Lopez office for follow up. Reviewed Red Flag Symptoms. Will continue to monitor. MRI 3/30/21  IMPRESSION  1. Broad-based right posterior lateral disc protrusion at L5-S1 that compresses  the traversing right S1 nerve root. Moderate right and mild left foramina also  present at L5-S1.  2. Bilateral right greater than left degenerative facet arthropathy at L4-L5.  3. Incompletely evaluated simple appearing small right hepatic lobe cysts.     Assessment of clinical changes and knowledge demonstrated since last call:   Ongoing Plan of Care:   Goal:Demonstrates no signs of complications or red flags in 30 days;  · Review and discuss importance of monitoring and reporting red flags;  · Review medications and when taken with patient to ensure understanding;  · Educate patient when to call the physician or 911;  · Assess for any barriers with care access or mobility needs at home;  · Instruct patient to immediately notify the physician if: any red flag symptoms                Pain Management   Goal:Demonstrates  pain control with rating of 6 or less with 7 days  · 21 states short term relief after KELLY,but pain returned and continues at 10/10, minimal relief with medication  · 3/29/21 States pain 10/10;  · States using her Robaxin and Tylenol with minimal control;  · Advised Using ice or heat on back  § Verbalized proper use of ice not against skin;  § States applies ice pack for 15 mins every 2 hrs;     Goal: Completes all FU appointments as ordered at DC;  21 Completed MRI and KELLY, has call into POC for follow up  3/29/21 Scheduled for MRI on 3/30/21 and Nerve Block 4/2/21  3/29/21 Completed appointment with Tobias Gil 3/26/21  · Reviewed DC instructions and FU appointments with patient;  · Verbalized appt with Orthopedic Surgeon on 03/26/21 @10am;          Review and discussion of plan of care with patient, who has provided input to plan, verbalized understanding and agrees with current goals. Any recurrence Red Flags or continued symptoms:see above     Medication Regimen Change:none  Completed a review of medications with patient, who verbalized understanding of how and when to take medications. Barriers / Adherence with medications:none    Upcoming Appointments:  No future appointments. Patient asked questions appropriately and denied any additional needs at this time. Patient verbalized understanding of all information discussed. Patient has my name and contact information for any follow up needs or questions.      Plan next call: about 1 week

## 2021-04-13 ENCOUNTER — PATIENT OUTREACH (OUTPATIENT)
Dept: OTHER | Age: 56
End: 2021-04-13

## 2021-04-13 RX ORDER — CEFAZOLIN SODIUM/WATER 2 G/20 ML
2 SYRINGE (ML) INTRAVENOUS ONCE
Status: CANCELLED | OUTPATIENT
Start: 2021-04-13 | End: 2021-04-13

## 2021-04-13 NOTE — PROGRESS NOTES
Care Manager contacted the patient by telephone in follow up. Verified  and zip code with patient as identifiers. ACM spoke with patient, who reports she had no relief from [facility] and in fact had worsening pain and numbness in leg. Was seen for follow up by Dontrell Ferrer on 21, plans for L5 hemilaminectomy on 21. Patient states pain is 8-10/10 with increased numbness in in right leg and foot. Taking pain medication as directed, son assisting with activities as needed. Has completed FMLA and STD paperwork. No new questions or concerns. Will plan follow up after surgery.     Assessment of clinical changes and knowledge demonstrated since last call:   Ongoing Plan of Care:     Goal:Demonstrates no signs of complications or red flags in 30 days;  · Review and discuss importance of monitoring and reporting red flags;  · Review medications and when taken with patient to ensure understanding;  · Educate patient when to call the physician or 911;  · Assess for any barriers with care access or mobility needs at home;  · Instruct patient to immediately notify the physician if: any red flag symptoms                Pain Management   Goal:Demonstrates  pain control with rating of 6 or less with 7 days  21 Reports increased pain 8-10/10, and numbness, scheduled for surgery 21  · 21 states short term relief after KELLY,but pain returned and continues at 10/10, minimal relief with medication  · 3/29/21 States pain 10/10;  · States using her Robaxin and Tylenol with minimal control;  · Advised Using ice or heat on back  § Verbalized proper use of ice not against skin;  § States applies ice pack for 15 mins every 2 hrs;     Goal: Completes all FU appointments as ordered at [facility];  21 Completed follow up with surgeon on 21;scheduled for surgery 21 Completed MRI and KELLY, has call into POC for follow up  3/29/21 Scheduled for MRI on 3/30/21 and Nerve Block 4/2/21  3/29/21 Completed appointment with Sal Schwartz 3/26/21  · Reviewed DC instructions and FU appointments with patient;  · Verbalized appt with Orthopedic Surgeon on 03/26/21 @10am;     Review and discussion of plan of care with patient, who has provided input to plan, verbalized understanding and agrees with current goals. Any recurrence Red Flags or continued symptoms: increased pain and radiculopathy     Medication Regimen Change:none  Completed a review of medications with patient, who verbalized understanding of how and when to take medications. Barriers / Adherence with medications:none    Upcoming Appointments:    Future Appointments   Date Time Provider Ursula Hoover   4/14/2021 10:05 AM CONSOLIDATED DRIVE THRU SSA IMD IMD   4/19/2021 10:30 AM SFD ASSESSMENT RM 03 SFDORPA SFD OR PRE A       Patient asked questions appropriately and denied any additional needs at this time. Patient verbalized understanding of all information discussed. Patient has my name and contact information for any follow up needs or questions.      Plan next call:   After surgery

## 2021-04-19 ENCOUNTER — HOSPITAL ENCOUNTER (OUTPATIENT)
Dept: SURGERY | Age: 56
Discharge: HOME | End: 2021-04-19
Payer: COMMERCIAL

## 2021-04-19 VITALS
RESPIRATION RATE: 16 BRPM | HEIGHT: 69 IN | TEMPERATURE: 97.7 F | WEIGHT: 174.9 LBS | HEART RATE: 72 BPM | BODY MASS INDEX: 25.9 KG/M2 | SYSTOLIC BLOOD PRESSURE: 121 MMHG | OXYGEN SATURATION: 100 % | DIASTOLIC BLOOD PRESSURE: 92 MMHG

## 2021-04-19 LAB
BACTERIA SPEC CULT: NORMAL
HGB BLD-MCNC: 11.6 G/DL (ref 11.7–15.4)
POTASSIUM SERPL-SCNC: 4.7 MMOL/L (ref 3.5–5.1)
SERVICE CMNT-IMP: NORMAL

## 2021-04-19 PROCEDURE — 84132 ASSAY OF SERUM POTASSIUM: CPT

## 2021-04-19 PROCEDURE — 85018 HEMOGLOBIN: CPT

## 2021-04-19 PROCEDURE — 87641 MR-STAPH DNA AMP PROBE: CPT

## 2021-04-19 NOTE — PERIOP NOTES
Patient verified name, , and surgery as listed in [facility]. Patient provided medical/health information and PTA medications to the best of their ability. TYPE  CASE: 1B  Orders per surgeon: were received  Labs per surgeon:  Mrsa/mssa collected and results in [facility]  Labs per anesthesia protocol:hgb potassium collected and results in [facility]. EKG:  Not required per protocol    Patient COVID test results not detected      Nasal Swab collected per MD order. Patient provided with and instructed on education handouts including Guide to Surgery, blood transfusions, pain management, and hand hygiene for the family and community, and [facility] brochure. Road to Recovery Spine surgery patient guide given. Instructed on incentive spirometry with return demonstration. Patient viewed spine prehab video. Dynahex and instructions given per hospital policy. Original medication prescription bottles not visualized during patient appointment. Patient teach back successful and patient demonstrates knowledge of instruction. PLEASE CONTINUE TAKING ALL PRESCRIPTION MEDICATIONS UP TO THE DAY OF SURGERY UNLESS OTHERWISE DIRECTED BELOW. DISCONTINUE all vitamins and supplements 7 days prior to surgery. DISCONTINUE Non-Steriodal Anti-Inflammatory (NSAIDS) such as Advil and Aleve 5 days prior to surgery. Home Medications to take  the day of surgery   CARVEDILOL   PROTONIX    BRING ALBUTEROL INHALER DAY OF SURGERY    FLONASE IF NEEDED     Home Medications   to Hold   HOLD POTASSIUM CHLORIDE MORNING OF SURGERY    HOLD EDARBYCLOR MORNING OF SURGERY     HOLD CELEBREX PRIOR TO SURGERY     Comments         *Visitor policy of 1 visitor per patient discussed. Please do not bring home medications with you on the day of surgery unless otherwise directed by your nurse.   If you are instructed to bring home medications, please give them to your nurse as they will be administered by the nursing staff. If you have any questions, please call [facility] [phone] or [facility] [phone]. A copy of this note was provided to the patient for reference.

## 2021-04-20 ENCOUNTER — ANESTHESIA EVENT (OUTPATIENT)
Dept: SURGERY | Age: 56
End: 2021-04-20
Payer: COMMERCIAL

## 2021-04-20 NOTE — PERIOP NOTES
Potassium 4.7   3.5 - 5.1 mmol/L Final     HGB 11.6  Low   11.7 - 15.4 g/dL Final     Special Requests:      Final   NO SPECIAL REQUESTS    Culture result:      Final   SA target not detected.                                 A MRSA NEGATIVE, SA NEGATIVE test result does not preclude MRSA or SA nasal colonization.

## 2021-04-21 ENCOUNTER — APPOINTMENT (OUTPATIENT)
Dept: GENERAL RADIOLOGY | Age: 56
End: 2021-04-21
Attending: ORTHOPAEDIC SURGERY
Payer: COMMERCIAL

## 2021-04-21 ENCOUNTER — HOSPITAL ENCOUNTER (OUTPATIENT)
Age: 56
Setting detail: OUTPATIENT SURGERY
Discharge: HOME | End: 2021-04-21
Attending: ORTHOPAEDIC SURGERY | Admitting: ORTHOPAEDIC SURGERY
Payer: COMMERCIAL

## 2021-04-21 ENCOUNTER — ANESTHESIA (OUTPATIENT)
Dept: SURGERY | Age: 56
End: 2021-04-21
Payer: COMMERCIAL

## 2021-04-21 VITALS
OXYGEN SATURATION: 82 % | RESPIRATION RATE: 14 BRPM | HEART RATE: 67 BPM | WEIGHT: 175 LBS | DIASTOLIC BLOOD PRESSURE: 80 MMHG | SYSTOLIC BLOOD PRESSURE: 142 MMHG | TEMPERATURE: 97.8 F | BODY MASS INDEX: 25.92 KG/M2 | HEIGHT: 69 IN

## 2021-04-21 DIAGNOSIS — M51.26 DISPLACEMENT OF LUMBAR INTERVERTEBRAL DISC WITHOUT MYELOPATHY: Primary | ICD-10-CM

## 2021-04-21 PROBLEM — M54.16 LUMBAR RADICULOPATHY: Status: ACTIVE | Noted: 2021-04-21

## 2021-04-21 PROCEDURE — 74011250636 HC RX REV CODE- 250/636: Performed by: NURSE ANESTHETIST, CERTIFIED REGISTERED

## 2021-04-21 PROCEDURE — 74011250636 HC RX REV CODE- 250/636: Performed by: ANESTHESIOLOGY

## 2021-04-21 PROCEDURE — 77030019908 HC STETH ESOPH SIMS -A: Performed by: ANESTHESIOLOGY

## 2021-04-21 PROCEDURE — 77030037088 HC TUBE ENDOTRACH ORAL NSL COVD-A: Performed by: STUDENT IN AN ORGANIZED HEALTH CARE EDUCATION/TRAINING PROGRAM

## 2021-04-21 PROCEDURE — 74011000250 HC RX REV CODE- 250: Performed by: NURSE ANESTHETIST, CERTIFIED REGISTERED

## 2021-04-21 PROCEDURE — 76060000033 HC ANESTHESIA 1 TO 1.5 HR: Performed by: ORTHOPAEDIC SURGERY

## 2021-04-21 PROCEDURE — 74011250636 HC RX REV CODE- 250/636: Performed by: ORTHOPAEDIC SURGERY

## 2021-04-21 PROCEDURE — 74011250637 HC RX REV CODE- 250/637: Performed by: ORTHOPAEDIC SURGERY

## 2021-04-21 PROCEDURE — 77030025623 HC BUR RND PRECIS STRY -D: Performed by: ORTHOPAEDIC SURGERY

## 2021-04-21 PROCEDURE — 74011000250 HC RX REV CODE- 250: Performed by: ORTHOPAEDIC SURGERY

## 2021-04-21 PROCEDURE — 76010000161 HC OR TIME 1 TO 1.5 HR INTENSV-TIER 1: Performed by: ORTHOPAEDIC SURGERY

## 2021-04-21 PROCEDURE — 77030040361 HC SLV COMPR DVT MDII -B: Performed by: ORTHOPAEDIC SURGERY

## 2021-04-21 PROCEDURE — 74011250637 HC RX REV CODE- 250/637: Performed by: ANESTHESIOLOGY

## 2021-04-21 PROCEDURE — 77030031139 HC SUT VCRL2 J&J -A: Performed by: ORTHOPAEDIC SURGERY

## 2021-04-21 PROCEDURE — 63047 LAM FACETEC & FORAMOT LUMBAR: CPT | Performed by: ORTHOPAEDIC SURGERY

## 2021-04-21 PROCEDURE — 77030039425 HC BLD LARYNG TRULITE DISP TELE -A: Performed by: STUDENT IN AN ORGANIZED HEALTH CARE EDUCATION/TRAINING PROGRAM

## 2021-04-21 PROCEDURE — 74011000272 HC RX REV CODE- 272: Performed by: ORTHOPAEDIC SURGERY

## 2021-04-21 PROCEDURE — 77030012894: Performed by: ORTHOPAEDIC SURGERY

## 2021-04-21 PROCEDURE — 77030029099 HC BN WAX SSPC -A: Performed by: ORTHOPAEDIC SURGERY

## 2021-04-21 PROCEDURE — 77030018673: Performed by: ORTHOPAEDIC SURGERY

## 2021-04-21 PROCEDURE — 77030034479 HC ADH SKN CLSR PRINEO J&J -B: Performed by: ORTHOPAEDIC SURGERY

## 2021-04-21 PROCEDURE — 77030040922 HC BLNKT HYPOTHRM STRY -A: Performed by: ANESTHESIOLOGY

## 2021-04-21 PROCEDURE — 77030028270 HC SRGFL HEMSTAT MTRX J&J -C: Performed by: ORTHOPAEDIC SURGERY

## 2021-04-21 PROCEDURE — 76210000021 HC REC RM PH II 0.5 TO 1 HR: Performed by: ORTHOPAEDIC SURGERY

## 2021-04-21 PROCEDURE — 2709999900 HC NON-CHARGEABLE SUPPLY: Performed by: ORTHOPAEDIC SURGERY

## 2021-04-21 PROCEDURE — 76210000006 HC OR PH I REC 0.5 TO 1 HR: Performed by: ORTHOPAEDIC SURGERY

## 2021-04-21 PROCEDURE — 72020 X-RAY EXAM OF SPINE 1 VIEW: CPT

## 2021-04-21 RX ORDER — MIDAZOLAM HYDROCHLORIDE 1 MG/ML
2 INJECTION, SOLUTION INTRAMUSCULAR; INTRAVENOUS
Status: DISCONTINUED | OUTPATIENT
Start: 2021-04-21 | End: 2021-04-21 | Stop reason: HOSPADM

## 2021-04-21 RX ORDER — MIDAZOLAM HYDROCHLORIDE 1 MG/ML
INJECTION, SOLUTION INTRAMUSCULAR; INTRAVENOUS AS NEEDED
Status: DISCONTINUED | OUTPATIENT
Start: 2021-04-21 | End: 2021-04-21 | Stop reason: HOSPADM

## 2021-04-21 RX ORDER — DEXAMETHASONE SODIUM PHOSPHATE 4 MG/ML
INJECTION, SOLUTION INTRA-ARTICULAR; INTRALESIONAL; INTRAMUSCULAR; INTRAVENOUS; SOFT TISSUE AS NEEDED
Status: DISCONTINUED | OUTPATIENT
Start: 2021-04-21 | End: 2021-04-21 | Stop reason: HOSPADM

## 2021-04-21 RX ORDER — ACETAMINOPHEN 500 MG
1000 TABLET ORAL ONCE
Status: COMPLETED | OUTPATIENT
Start: 2021-04-21 | End: 2021-04-21

## 2021-04-21 RX ORDER — SODIUM CHLORIDE, SODIUM LACTATE, POTASSIUM CHLORIDE, CALCIUM CHLORIDE 600; 310; 30; 20 MG/100ML; MG/100ML; MG/100ML; MG/100ML
75 INJECTION, SOLUTION INTRAVENOUS CONTINUOUS
Status: DISCONTINUED | OUTPATIENT
Start: 2021-04-21 | End: 2021-04-21 | Stop reason: HOSPADM

## 2021-04-21 RX ORDER — LIDOCAINE HYDROCHLORIDE 10 MG/ML
0.1 INJECTION INFILTRATION; PERINEURAL AS NEEDED
Status: DISCONTINUED | OUTPATIENT
Start: 2021-04-21 | End: 2021-04-21 | Stop reason: HOSPADM

## 2021-04-21 RX ORDER — NEOSTIGMINE METHYLSULFATE 1 MG/ML
INJECTION, SOLUTION INTRAVENOUS AS NEEDED
Status: DISCONTINUED | OUTPATIENT
Start: 2021-04-21 | End: 2021-04-21 | Stop reason: HOSPADM

## 2021-04-21 RX ORDER — PROPOFOL 10 MG/ML
INJECTION, EMULSION INTRAVENOUS AS NEEDED
Status: DISCONTINUED | OUTPATIENT
Start: 2021-04-21 | End: 2021-04-21 | Stop reason: HOSPADM

## 2021-04-21 RX ORDER — LIDOCAINE HYDROCHLORIDE 20 MG/ML
INJECTION, SOLUTION EPIDURAL; INFILTRATION; INTRACAUDAL; PERINEURAL AS NEEDED
Status: DISCONTINUED | OUTPATIENT
Start: 2021-04-21 | End: 2021-04-21 | Stop reason: HOSPADM

## 2021-04-21 RX ORDER — NALOXONE HYDROCHLORIDE 0.4 MG/ML
0.2 INJECTION, SOLUTION INTRAMUSCULAR; INTRAVENOUS; SUBCUTANEOUS AS NEEDED
Status: DISCONTINUED | OUTPATIENT
Start: 2021-04-21 | End: 2021-04-21 | Stop reason: HOSPADM

## 2021-04-21 RX ORDER — EPHEDRINE SULFATE/0.9% NACL/PF 50 MG/5 ML
SYRINGE (ML) INTRAVENOUS AS NEEDED
Status: DISCONTINUED | OUTPATIENT
Start: 2021-04-21 | End: 2021-04-21 | Stop reason: HOSPADM

## 2021-04-21 RX ORDER — SODIUM CHLORIDE 0.9 % (FLUSH) 0.9 %
5-40 SYRINGE (ML) INJECTION EVERY 8 HOURS
Status: DISCONTINUED | OUTPATIENT
Start: 2021-04-21 | End: 2021-04-21 | Stop reason: HOSPADM

## 2021-04-21 RX ORDER — CEFAZOLIN SODIUM/WATER 2 G/20 ML
2 SYRINGE (ML) INTRAVENOUS ONCE
Status: COMPLETED | OUTPATIENT
Start: 2021-04-21 | End: 2021-04-21

## 2021-04-21 RX ORDER — ROCURONIUM BROMIDE 10 MG/ML
INJECTION, SOLUTION INTRAVENOUS AS NEEDED
Status: DISCONTINUED | OUTPATIENT
Start: 2021-04-21 | End: 2021-04-21 | Stop reason: HOSPADM

## 2021-04-21 RX ORDER — KETOROLAC TROMETHAMINE 30 MG/ML
INJECTION, SOLUTION INTRAMUSCULAR; INTRAVENOUS AS NEEDED
Status: DISCONTINUED | OUTPATIENT
Start: 2021-04-21 | End: 2021-04-21 | Stop reason: HOSPADM

## 2021-04-21 RX ORDER — SODIUM CHLORIDE 0.9 % (FLUSH) 0.9 %
5-40 SYRINGE (ML) INJECTION AS NEEDED
Status: DISCONTINUED | OUTPATIENT
Start: 2021-04-21 | End: 2021-04-21 | Stop reason: HOSPADM

## 2021-04-21 RX ORDER — HYDROCODONE BITARTRATE AND ACETAMINOPHEN 5; 325 MG/1; MG/1
1 TABLET ORAL
Qty: 30 TAB | Refills: 0 | Status: SHIPPED | OUTPATIENT
Start: 2021-04-21 | End: 2021-04-26

## 2021-04-21 RX ORDER — FENTANYL CITRATE 50 UG/ML
INJECTION, SOLUTION INTRAMUSCULAR; INTRAVENOUS AS NEEDED
Status: DISCONTINUED | OUTPATIENT
Start: 2021-04-21 | End: 2021-04-21 | Stop reason: HOSPADM

## 2021-04-21 RX ORDER — GLYCOPYRROLATE 0.2 MG/ML
INJECTION INTRAMUSCULAR; INTRAVENOUS AS NEEDED
Status: DISCONTINUED | OUTPATIENT
Start: 2021-04-21 | End: 2021-04-21 | Stop reason: HOSPADM

## 2021-04-21 RX ORDER — HYDROMORPHONE HYDROCHLORIDE 1 MG/ML
0.5 INJECTION, SOLUTION INTRAMUSCULAR; INTRAVENOUS; SUBCUTANEOUS
Status: DISCONTINUED | OUTPATIENT
Start: 2021-04-21 | End: 2021-04-21 | Stop reason: HOSPADM

## 2021-04-21 RX ORDER — ONDANSETRON 2 MG/ML
INJECTION INTRAMUSCULAR; INTRAVENOUS AS NEEDED
Status: DISCONTINUED | OUTPATIENT
Start: 2021-04-21 | End: 2021-04-21 | Stop reason: HOSPADM

## 2021-04-21 RX ORDER — OXYCODONE AND ACETAMINOPHEN 5; 325 MG/1; MG/1
1 TABLET ORAL AS NEEDED
Status: DISCONTINUED | OUTPATIENT
Start: 2021-04-21 | End: 2021-04-21 | Stop reason: HOSPADM

## 2021-04-21 RX ORDER — VANCOMYCIN HYDROCHLORIDE 1 G/20ML
INJECTION, POWDER, LYOPHILIZED, FOR SOLUTION INTRAVENOUS AS NEEDED
Status: DISCONTINUED | OUTPATIENT
Start: 2021-04-21 | End: 2021-04-21 | Stop reason: HOSPADM

## 2021-04-21 RX ORDER — BUPIVACAINE HYDROCHLORIDE 5 MG/ML
INJECTION, SOLUTION EPIDURAL; INTRACAUDAL AS NEEDED
Status: DISCONTINUED | OUTPATIENT
Start: 2021-04-21 | End: 2021-04-21 | Stop reason: HOSPADM

## 2021-04-21 RX ADMIN — PHENYLEPHRINE HYDROCHLORIDE 150 MCG: 10 INJECTION INTRAVENOUS at 14:55

## 2021-04-21 RX ADMIN — FENTANYL CITRATE 50 MCG: 50 INJECTION INTRAMUSCULAR; INTRAVENOUS at 15:14

## 2021-04-21 RX ADMIN — OXYCODONE HYDROCHLORIDE AND ACETAMINOPHEN 1 TABLET: 5; 325 TABLET ORAL at 16:25

## 2021-04-21 RX ADMIN — SODIUM CHLORIDE, SODIUM LACTATE, POTASSIUM CHLORIDE, AND CALCIUM CHLORIDE: 600; 310; 30; 20 INJECTION, SOLUTION INTRAVENOUS at 15:26

## 2021-04-21 RX ADMIN — FENTANYL CITRATE 100 MCG: 50 INJECTION INTRAMUSCULAR; INTRAVENOUS at 14:38

## 2021-04-21 RX ADMIN — DEXAMETHASONE SODIUM PHOSPHATE 4 MG: 4 INJECTION, SOLUTION INTRAMUSCULAR; INTRAVENOUS at 14:55

## 2021-04-21 RX ADMIN — ROCURONIUM BROMIDE 40 MG: 10 INJECTION, SOLUTION INTRAVENOUS at 14:40

## 2021-04-21 RX ADMIN — Medication 3 AMPULE: at 10:15

## 2021-04-21 RX ADMIN — NEOSTIGMINE METHYLSULFATE 4 MG: 1 INJECTION, SOLUTION INTRAVENOUS at 15:26

## 2021-04-21 RX ADMIN — Medication 10 MG: at 15:06

## 2021-04-21 RX ADMIN — FENTANYL CITRATE 25 MCG: 50 INJECTION INTRAMUSCULAR; INTRAVENOUS at 15:41

## 2021-04-21 RX ADMIN — MIDAZOLAM 2 MG: 1 INJECTION INTRAMUSCULAR; INTRAVENOUS at 14:33

## 2021-04-21 RX ADMIN — GLYCOPYRROLATE 0.6 MG: 0.2 INJECTION, SOLUTION INTRAMUSCULAR; INTRAVENOUS at 15:26

## 2021-04-21 RX ADMIN — CEFAZOLIN 2 G: 1 INJECTION, POWDER, FOR SOLUTION INTRAVENOUS at 14:52

## 2021-04-21 RX ADMIN — HYDROMORPHONE HYDROCHLORIDE 0.5 MG: 1 INJECTION, SOLUTION INTRAMUSCULAR; INTRAVENOUS; SUBCUTANEOUS at 15:57

## 2021-04-21 RX ADMIN — HYDROMORPHONE HYDROCHLORIDE 0.5 MG: 1 INJECTION, SOLUTION INTRAMUSCULAR; INTRAVENOUS; SUBCUTANEOUS at 15:52

## 2021-04-21 RX ADMIN — SODIUM CHLORIDE, SODIUM LACTATE, POTASSIUM CHLORIDE, AND CALCIUM CHLORIDE 75 ML/HR: 600; 310; 30; 20 INJECTION, SOLUTION INTRAVENOUS at 10:16

## 2021-04-21 RX ADMIN — ACETAMINOPHEN 1000 MG: 500 TABLET ORAL at 10:16

## 2021-04-21 RX ADMIN — KETOROLAC TROMETHAMINE 15 MG: 30 INJECTION, SOLUTION INTRAMUSCULAR at 15:21

## 2021-04-21 RX ADMIN — PROPOFOL 200 MG: 10 INJECTION, EMULSION INTRAVENOUS at 14:40

## 2021-04-21 RX ADMIN — ONDANSETRON 4 MG: 2 INJECTION INTRAMUSCULAR; INTRAVENOUS at 14:55

## 2021-04-21 RX ADMIN — LIDOCAINE HYDROCHLORIDE 60 MG: 20 INJECTION, SOLUTION EPIDURAL; INFILTRATION; INTRACAUDAL; PERINEURAL at 14:40

## 2021-04-21 RX ADMIN — FENTANYL CITRATE 25 MCG: 50 INJECTION INTRAMUSCULAR; INTRAVENOUS at 15:43

## 2021-04-21 NOTE — OP NOTES
[address] [address]   [phone]    OPERATIVE REPORT    Patient ID:Betty Venda Runner  [id number]  1965  60 y.o. DATE OF SURGERY: 4/21/2021    SURGEON: Dr. Clive Mota DIAGNOSIS:right  L5 - S1 lateral recess stenosis and disc herniation. POSTOPERATIVE DIAGNOSIS: right L5 - S1 lateral recess stenosis and disc herniation. PROCEDURE:     1. right L5 hemilaminectomy including discectomy. (CPT [id number])      ANESTHESIA: General.    ESTIMATED BLOOD LOSS: 10 cc    POSTOPERATIVE CONDITION: Stable. INTRAOPERATIVE COMPLICATIONS: None. INDICATION FOR PROCEDURE: The patient has a history of low back pain with episodic radiation to the right lower extremity consistent with neurogenic claudication primarily affecting the S1 nerve root. The patient has failed an extended period of observation and conservative measures. In the outpatient setting, the risks, benefits, and potential complications of the procedure were discussed and an informed consent was obtained. DESCRIPTION OF PROCEDURE: After adequate induction of general anesthesia, the patient was positioned prone on the Obdulia Frankie spinal table. Care was taken to pad all bony prominences. Shoulders and elbows were placed in a 90-90 position. The abdomen was allowed to hang free to decrease intraabdominal pressure. The legs were flexed using the sling. The lumbar area was prepped and draped in the usual sterile fashion using a DuraPrep scrub. Preoperative antibiotics were administered. A time-out was called to confirm appropriate patient, proposed procedure, and proposed incision site. With this confirmation, an incision was created over the appropriate interspace. Dissection was carried down to the lumbodorsal fascia. The lumbodorsal fascia was released on the right side and dissection was carried down to the lamina and pars interarticularis.  A 000 curette was used to elevate the ligamentum flavum at its origin on the caudal surface of the L5  lamina and a Penfield number four was slipped just anterior to the lamina. C-arm fluoroscopy was brought in and used to obtain a cross table fluoroscopic image to confirm appropriate spinal level. With this confirmation, the Yamilet Bee number four was removed and the area was marked with electrocautery. The operating microscope was brought to the surgical field. The ligamentum flavum was then elevated off of its insertion on the cephalad surface of the  S1  lamina. A 4 mm Kerrison was used to perform a hemilaminectomy of L5. The ligamentum flavum was carefully elevated off of the thecal sac and excised with the Kerrison rongeur. The descending nerve root was identified and retracted medially. The lateral recess was undercut laterally to the pedicle. A foraminotomy was performed to decompress the exiting nerve root. The nerve root was retracted medially again with the Sarah nerve root retractor. The underlying disk was identified and inspected. A disc herniation was noted and a discectomy was also performed. The nerve was released from retraction and the area inspected and palpated with a nerve hook for adequacy of decompression. This was satisfactory. The lateral rescess was flushed with saline solution. The dorsal wound was flushed as well. The lumbodorsal fascia was approximated with a number one Vicryl suture in interrupted fashion. The skin and subcutaneous tissue were then closed in a layered fashion. Marcaine was infiltrated subcutaneously. Dermabond was applied. The patient tolerated the procedure well and was returned to the postanesthesia care unit in stable condition. At the end of the case, all sponge, needle, and instrument counts were correct. Signed: Tamara Subramanian.  Concetta Gomez MD

## 2021-04-21 NOTE — DISCHARGE INSTRUCTIONS
Discharge Instructions    Wound Care and Showering  Your wound will typically be covered with a clear plastic dressing when you go home from the hospital. Since it is transparent, you will see the underlying gauze turn red with blood which is normal. You do not need to change the dressing unless it is leaking from the edges. Otherwise leave this dressing in place. The clear plastic dressing is waterproof so you can take a shower while it is on. You may remove the clear plastic dressing and the underlying gauze 3 days after surgery. There will be small tape strips under the gauze which should be left in place. If there is no leaking from the wound, you may take a shower and allow the tape strips to get wet. Some of them may fall off. The remaining strips will be removed once you return to the office. If there is persistent leaking when you first remove the clear dressing, apply new gauze and new clear plastic dressing (typically purchased at a pharmacy) over the wound. Hair washing is permissible in the shower. No tub baths, hot tubs or whirlpools until seen in the office. If any of the following should occur, please call the office:    -Persistent drainage from the incision site.  -Opening of incisions  -Fevers greater than 101 degrees  -Flu-like symptoms  -Increased redness    Exercise  You have unlimited walking and stair climbing privileges. Walking outside or walking on a treadmill without an incline is also allowed. Do NOT lift anything weighing greater than 10-15 lbs. Especially try to avoid lifting or reaching above your head. Sleeping  You may sleep in any comfortable position. Many patients find comfort sleeping in a recliner chair. It is normal to have difficulty sleeping for the first several weeks following your surgery. We recommend trying Benadryl, Melatonin, or Tylenol PM for help sleeping. All are over-the-counter and can be found in drugstores.      Eating  Because of the tubes in your throat while asleep during surgery, it is normal to have a sore throat and some difficulty swallowing solid foods after your surgery. This may persist for several weeks. Eating soft foods like yogurt, macaroni, and mashed potatoes seem to help. Today, you may have bland foods, nothing spicy or greasy. Pain  If you feel you need pain medicine, you may take regular or extra-strength Tylenol. Do NOT take an anti-inflammatory medication such as Advil, Aleve, or Motrin for the first 8 weeks following your surgery. Anti-inflammatory medications like these hinder bone growth and healing, which is critical in the weeks following surgery. Do NOT resume taking Foasamax for 8 weeks after your fusion surgery. To help alleviate persistent soreness around the shoulder blades, apply ice or warm moist compresses. Driving  You may NOT drive a car until told otherwise by your physician. You may be a passenger for short distances (about 20-30 minutes). If you must take a longer trip, be sure to make several pit stops so that you can walk and stretch your legs. Reclining in the passenger seat seems to be the most comfortable position for most patients. In some states, it is illegal to drive a car while wearing a neck brace. Follow up appointments  When you are discharged from the hospital, a follow up appointment will be made for 2-3 weeks from your surgery date. Call [phone] to confirm your appointment. After general anesthesia or intravenous sedation, for 24 hours or while taking prescription Narcotics:  · Limit your activities  · A responsible adult needs to be with you for the next 24 hours  · Do not drive and operate hazardous machinery  · Do not make important personal or business decisions  · Do not drink alcoholic beverages  · If you have not urinated within 8 hours after discharge, and you are experiencing discomfort from urinary retention, please go to the nearest ED.   · If you have sleep apnea and have a CPAP machine, please use it for all naps and sleeping. · Please use caution when taking narcotics and any of your home medications that may cause drowsiness. *  Please give a list of your current medications to your Primary Care Provider. *  Please update this list whenever your medications are discontinued, doses are      changed, or new medications (including over-the-counter products) are added. *  Please carry medication information at all times in case of emergency situations. These are general instructions for a healthy lifestyle:  No smoking/ No tobacco products/ Avoid exposure to second hand smoke  Surgeon General's Warning:  Quitting smoking now greatly reduces serious risk to your health. Obesity, smoking, and sedentary lifestyle greatly increases your risk for illness  A healthy diet, regular physical exercise & weight monitoring are important for maintaining a healthy lifestyle    You may be retaining fluid if you have a history of heart failure or if you experience any of the following symptoms:  Weight gain of 3 pounds or more overnight or 5 pounds in a week, increased swelling in our hands or feet or shortness of breath while lying flat in bed. Please call your doctor as soon as you notice any of these symptoms; do not wait until your next office visit.

## 2021-04-21 NOTE — ANESTHESIA POSTPROCEDURE EVALUATION
Procedure(s):  RIGHT  L5 RUDOLPH LAMINECTOMY / DISCECTOMY. general    Anesthesia Post Evaluation      Multimodal analgesia: multimodal analgesia used between 6 hours prior to anesthesia start to PACU discharge  Patient location during evaluation: bedside  Patient participation: complete - patient participated  Level of consciousness: awake and responsive to light touch  Pain management: adequate  Airway patency: patent  Anesthetic complications: no  Cardiovascular status: acceptable, hemodynamically stable, blood pressure returned to baseline and stable  Respiratory status: acceptable, unassisted, spontaneous ventilation and nonlabored ventilation  Hydration status: acceptable  Post anesthesia nausea and vomiting:  controlled      INITIAL Post-op Vital signs:   Vitals Value Taken Time   /83 04/21/21 1558   Temp 36.4 °C (97.5 °F) 04/21/21 1543   Pulse 60 04/21/21 1600   Resp 16 04/21/21 1547   SpO2 100 % 04/21/21 1600   Vitals shown include unvalidated device data.

## 2021-04-21 NOTE — ANESTHESIA PREPROCEDURE EVALUATION
Anesthetic History   No history of anesthetic complications            Review of Systems / Medical History  Patient summary reviewed, nursing notes reviewed and pertinent labs reviewed    Pulmonary  Within defined limits                 Neuro/Psych   Within defined limits           Cardiovascular    Hypertension: well controlled              Exercise tolerance: >4 METS     GI/Hepatic/Renal     GERD: well controlled           Endo/Other        Arthritis and anemia (H/O AUBRIE, latest hgb = 11.6)     Other Findings   Comments: H/O cervical cancer    sickle cell trait           Physical Exam    Airway  Mallampati: II  TM Distance: 4 - 6 cm  Neck ROM: normal range of motion   Mouth opening: Normal     Cardiovascular    Rhythm: regular  Rate: normal         Dental  No notable dental hx       Pulmonary  Breath sounds clear to auscultation               Abdominal  GI exam deferred       Other Findings            Anesthetic Plan    ASA: 2  Anesthesia type: general          Induction: Intravenous  Anesthetic plan and risks discussed with: Patient

## 2021-04-21 NOTE — PROGRESS NOTES
's pre-procedure visit and prayer with patient as requested.     Anahi Ahumada MDiv, BS  Board Certified

## 2021-04-22 ENCOUNTER — PATIENT OUTREACH (OUTPATIENT)
Dept: OTHER | Age: 56
End: 2021-04-22

## 2021-04-22 NOTE — PROGRESS NOTES
Care Transitions Initial Follow Up Call    Call within 2 business days of discharge: Yes     Patient: Jenni Calvin Patient : 1965 MRN: [id number]    Last Discharge [address]       Complaint Diagnosis Description Type Department Provider    21  Displacement of lumbar intervertebral disc without myelopathy Admission (Discharged) [facility] Arabella Dominguez MD          Was this an external facility discharge? No Discharge Facility: [facility]    Challenges to be reviewed by the provider   Additional needs identified to be addressed with provider none  none         Method of communication with provider : face to face, chart routing, staff message, phone    Discussed COVID-19 related testing which was available at this time. Test results were negative. Patient informed of results, if available? yes     Advance Care Planning:   Does patient have an Advance Directive: not discussed     Inpatient Readmission Risk score: No data recorded  Was this a readmission? no   Patient stated reason for the admission: back surgery    Patients top risk factors for readmission: functional physical ability and complications from surgery no known risk factors  Interventions to address risk factors: Education of patient/family/caregiver/guardian to support self-management-close follow u with surgeon and following discharge instructions to prevent complications    Care Transition Nurse (CTN) contacted the patient by telephone to perform post hospital discharge assessment. Verified name and  with patient as identifiers. Provided introduction to self, and explanation of the CTN role. Spoke with patient who reports she is doing well. Reports leg pain is resolved. Having post op pain as expected, controlled with pain medication. Denies nausea,vomiting or other complications. Tolerating diet, voiding well, no BM today. Cautioned to avoid constipation, patient uses Miralax.  Reviewed discharge instructions and restrictions. Patient ambulating as directed. Advised deep breathing and ankle pumps to avoid blood clots and pneumonia. Patient's adult son is in home and assisting as needed. No new concerns or questions. CTN reviewed discharge instructions, medical action plan and red flags with patient who verbalized understanding. Were discharge instructions available to patient? yes. Reviewed appropriate site of care based on symptoms and resources available to patient including: PCP, Specialist and When to call 911. Patient given an opportunity to ask questions and does not have any further questions or concerns at this time. The patient agrees to contact the PCP office for questions related to their healthcare. Medication reconciliation was performed with patient, who verbalizes understanding of administration of home medications. Advised obtaining a 90-day supply of all daily and as-needed medications. Referral to Pharm D needed: no     Home Health/Outpatient orders at discharge: none    Durable Medical Equipment ordered at discharge: None    Covid Risk Education    Patient has following risk factors of: no known risk factors. Education provided regarding infection prevention, and signs and symptoms of COVID-19 and when to seek medical attention with patient who verbalized understanding. Discussed exposure protocols and quarantine From CDC: Are you at higher risk for severe illness?  and given an opportunity for questions and concerns. The patient agrees to contact the COVID-19 hotline 962-242-3418 or PCP office for questions related to COVID-19. For more information on steps you can take to protect yourself, see CDC's How to Protect Yourself     Was patient discharged with a pulse oximeter? no Discussed and confirmed pulse oximeter discharge instructions and when to notify provider or seek emergency care. Discussed follow-up appointments.  If no appointment was previously scheduled, appointment scheduling offered: no Is follow up appointment scheduled within 7 days of discharge? no   [facility] follow up appointment(s):   Future Appointments   Date Time Provider Ursula Sneha   5/6/2021 10:00 AM Author Jerri Carroll MD POAP POA     RED FLAG SYMPTOMS:  Call you doctor now or seek immediate medical attention if:  · You pass out, lose consciousness;  · You have severe trouble breathing;  · You have sudden chest pain and shortness of breath;  · You cough up blood;  · You have severe belly pain;  · You are sick to your stomach and cannot hold down fluids. · You have trouble passing urine;  · You have pain that does not get better after taking your pain medications  · You have worsening  tingling, weakness, or numbness in your foot   · You have loose stitches, or your incision comes open. · You have signs of a blood clot such as:  · Pain in your calf, back of the knee, or groin;  · Redness and swelling in your leg or groin;  · You develop signs of an infection:  · Increased pain, swelling or redness at wound site;  · Pus draining from the wound site or on dressing;  · A fever;  · Notice a foul odor from the wound;      Goals Addressed: Goals updated S/P surgery 4/21/21  Impaired Skin Integrity     Goal:  Demonstrates no signs or symptoms of Infection or other complication within 30 days  · States incision remains covered with  dressing;  · Dressing dry and intact;    · Denies seeing any bleeding or drainage on dressing;  · Educated on signs and symptoms of infection to monitor when dressing removed;     Impaired Mobility postop   Goal:  Demonstrates return to baseline activity level and self-care  · Patient states she is ambulating around the house as directed and will try to increase frequency as tolerated  · Adult son is at home and is primary caregiver at this time;  · Reviewed post op restrictions and instructions    Pain Management postop   Goal:Demonstrates postop pain control   · States pain 5-7/10 at rest;9-10/10 when walking and moving  · States using her Norco with good control; Also has Robaxin for muscle spasms  · Using ice on back to lessen swelling and pain;  · Verbalized proper use of ice not against skin;  States applies ice pack for 15 mins every 2 hrs;    Goal: Completes all FU appointments as ordered at DC;  4/22/21 Surgery completed 4/21/21, post op visit scheduled 5/6/21 4/13/21 Completed follow up with surgeon on 4/12/21;scheduled for surgery 4/21/21 4/5/21 Completed MRI and KELLY, has call into POC for follow up  3/29/21 Scheduled for MRI on 3/30/21 and Nerve Block 4/2/21  3/29/21 Completed appointment with Moni Redmond 3/26/21  · Reviewed DC instructions and FU appointments with patient;  · Verbalized appt with Orthopedic Surgeon on 03/26/21 @10am    Plan for follow-up call in 7-10 days based on severity of symptoms and risk factors. Plan for next call: follow up pain/ambulation  CTN provided contact information for future needs.

## 2021-04-29 ENCOUNTER — PATIENT OUTREACH (OUTPATIENT)
Dept: OTHER | Age: 56
End: 2021-04-29

## 2021-04-29 NOTE — PROGRESS NOTES
Additional needs identified to be addressed with provider no  none         Method of communication with provider : none    Discussed COVID-19 related testing which was available at this time. Test results were negative. Patient informed of results, if available? yes     Care Transition Nurse (CTN) contacted the patient by telephone to follow up after admission on 21. Verified name and  with patient as identifiers. Addressed changes since last contact: Patient reports she is doing very well. States minimal pain at incision. Denies any further leg pain. Rates pain at 4/10. controlled with no pain medication. Reports walking as directed by surgeon. Incision healing well, denies fever or other signs of infection. No new concnerns or questions. Discharge needs reviewed: none   Follow up appointment completed? No, 1st post op visit scheduled for 21 Was follow up appointment scheduled within 7 days of discharge? no     Advance Care Planning:   Does patient have an Advance Directive:  No ACP but has identifed decision maker on file     CTN reviewed discharge instructions, medical action plan and red flags with patient and discussed any barriers to care and/or understanding of plan of care after discharge. Discussed appropriate site of care based on symptoms and resources available to patient including: Specialist and When to call 911. The patient agrees to contact the Specialist/PCP office for questions related to their healthcare.      Patients top risk factors for readmission: infection or other complication from surgery no known risk factors  Interventions to address risk factors: Education regarding signs and symptoms of infection or other complication and follow up with surgeon as directed    [facility] follow up appointment(s):   Future Appointments   Date Time Provider Ursula Hoover   2021 10:00 AM Feroz Ta MD [address] for follow-up call in 10-14 days based on severity of symptoms and risk factors. Plan for next call: follow up after post op visit  CTN provided contact information for future needs. Goals Addressed                 This Visit's Progress     :Demonstrates postop pain control   On track     Completes all FU appointments as ordered at DC;    On track     Demonstrates no signs or symptoms of Infection or other complication within 30 days   On track     Demonstrates return to baseline activity level and self-care   On track

## 2021-05-12 ENCOUNTER — PATIENT OUTREACH (OUTPATIENT)
Dept: OTHER | Age: 56
End: 2021-05-12

## 2021-05-12 NOTE — PROGRESS NOTES
Additional needs identified to be addressed with provider no           Method of communication with provider : none    Discussed COVID-19 related testing which was available at this time. Test results were negative. Patient informed of results, if available? Tested and notified prior to surgery     Care Transition Nurse (CTN) contacted the patient by telephone to follow up after admission on 21. Verified name and  with patient as identifiers. Addressed changes since last contact: Patient reports she is doing well. Has relief from leg pain after surgery. Seen by surgeon for post op visit, with good report. Patient had a small open are in incision with small amount of serosanguinous drainage. Surgeon felt it was related to a suture, area was cleaned and dressed and rechecked by surgeon in 3 days with resolution. Patient reports area is dry and healing well. No other complications or concerns. Patient is walking inside and outside of house several times a day. Pain well controlled with only Tylenol. Rates pain at 2-3/10. No new questions or concerns  Discharge needs reviewed: none   Follow up appointment completed? yes yes Was follow up appointment scheduled within 7 days of discharge? yes     Advance Care Planning:   Does patient have an Advance Directive:  not addressed at this call     CTN reviewed discharge instructions, medical action plan and red flags with patient and discussed any barriers to care and/or understanding of plan of care after discharge. Discussed appropriate site of care based on symptoms and resources available to patient including: PCP, Specialist and When to call 911. The patient agrees to contact the PCP office for questions related to their healthcare. Patients top risk factors for readmission: Infection or other post op complication   Interventions to address risk factors: Continued education of signs of complication and close follow up with surgeon.     [facility] follow up appointment(s):Dr Ava Wright in about 5 weeks    Plan for follow-up call in 10-14 days based on severity of symptoms and risk factors. Plan for next call: follow up  CTN provided contact information for future needs. Goals Addressed                 This Visit's Progress     :Demonstrates postop pain control   On track     Completes all FU appointments as ordered at DC;    On track     Demonstrates no signs or symptoms of Infection or other complication within 30 days   On track     Demonstrates return to baseline activity level and self-care   On track

## 2021-05-28 ENCOUNTER — PATIENT OUTREACH (OUTPATIENT)
Dept: OTHER | Age: 56
End: 2021-05-28

## 2021-05-28 NOTE — PROGRESS NOTES
Resolving current episode (Transitions of care complete). No further ED/UC or hospital admissions within 30 days post discharge. Patient attended follow-up appointments as directed. No outreach from patient to [address] [place] [address]. Will continue to follow patient under current HPRP CM episode. Patient reports she is doing well. Walking as instructed and returning to usual activity. Denies pain or other complications. Patient verbalized concern that she has not yet received any STD payments. Reports she and physician have completed and returned all necessary forms as instructed. Patient has called [employer] but has not received information regarding claim. Patient fearful she will be unable to pay her bills if pay not received soon. Patient agreeable for CC to call [employer] and inquire about status of claim. Call placed to [facility], left voicemail for and sent email to absence manager Linn Nash requesting information on claim. 10:52am E-mail received from absence manager stating claim has not been approved by [facility] and patient would need to contact [facility]. 11:10am Three way call placed with patient to [facility]. Per rep [facility] had requested additional documentation from attending to support patient's time out of work from 3/25/21 to 4/20/21 (prior to surgery). This information is required before a decision can be made on STD claim. 11:31am Call placed and message left with 's office requesting office notes for all visits prior to surgery be faxed to [facility] @[phone] and to include Claim # [id number] on correspondence. CC will forward note to Mark Enciso as well.

## 2021-06-01 NOTE — PROGRESS NOTES
Spoke with Margarita Angel and told her that [facility] would need to send a written request to [facility] for records. Told her to have them fax request to [phone].

## 2021-06-03 ENCOUNTER — PATIENT OUTREACH (OUTPATIENT)
Dept: OTHER | Age: 56
End: 2021-06-03

## 2021-06-03 NOTE — PROGRESS NOTES
Care Manager contacted the patient by telephone in follow up. Verified  and zip code with patient as identifiers. Spoke with patient for update on STD status. Per patient she was notified by surgeon's office on 21 that Jules Rutom Ellis Chang would need to fax request for the records needed to [facility] @ [phone]. Patient notified Ashkan of above on 21 but has not heard from Jules Downing. ACM called [facility] who verified the record request was sent to [facility] on 21. ACM called [employer] , who states request has not yet been documented in their system and suggest a return call to check status on 21. Also discussed with patient applying for the [facility] [place] [facility]. Link emailed to patient for access to application. Patient reports she is doing well from surgery, no new complaints or concerns, reports she continues to experience some pain with extended walking or standing, but no other complaints. Will follow up with patient 21.        Assessment of clinical changes and knowledge demonstrated since last call:   Ongoing Plan of Care:   Goal:Demonstrates no signs of complications or red flags in 30 days;  · Review and discuss importance of monitoring and reporting red flags;  · Review medications and when taken with patient to ensure understanding;  · Educate patient when to call the physician or 911;  · Assess for any barriers with care access or mobility needs at home;  · Instruct patient to immediately notify the physician if: any red flag symptoms                Pain Management   Goal:Demonstrates  pain control with rating of 6 or less with 7 days  Goal Met     Goal: Completes all FU appointments as ordered at [facility];  6/3/21 Patient compliant with all visits and follow up  21 Completed follow up with surgeon on 21;scheduled for surgery 21 Completed MRI and KELLY, has call into POC for follow up  3/29/21 Scheduled for MRI on 3/30/21 and Nerve Block 4/2/21  3/29/21 Completed appointment with Joseph Hartley 3/26/21  · Reviewed DC instructions and FU appointments with patient;  · Verbalized appt with Orthopedic Surgeon on 03/26/21 @10am;       Review and discussion of plan of care with patient, who has provided input to plan, verbalized understanding and agrees with current goals. Any recurrence Red Flags or continued symptoms:none     Medication Regimen Change:none  Completed a review of medications with patient, who verbalized understanding of how and when to take medications. Barriers / Adherence with medications:    Patient asked questions appropriately and denied any additional needs at this time. Patient verbalized understanding of all information discussed. Patient has my name and contact information for any follow up needs or questions.      Plan next call:   6/7/21

## 2021-06-07 ENCOUNTER — PATIENT OUTREACH (OUTPATIENT)
Dept: OTHER | Age: 56
End: 2021-06-07

## 2021-06-07 NOTE — PROGRESS NOTES
Care Manager contacted the patient by telephone in follow up. Verified  and zip code with patient as identifiers. Call to patient for follow up of STD. Patient reports she has spoken with [employer] this am and they have received the information requested from the attending physician. Reports representative states they will expedite claim, but may take another 7 to 10 days for completion. Patient has been unable to access [facility] to complete online application for the [employer] fund. [facility] provided phone number and information to contact [employer] to apply over the phone. Patient agreeable with plan. Patient had another call come in and call was ended.  Will plan close follow up for approval of STD and completion of application for hardship request.

## 2021-06-11 ENCOUNTER — PATIENT OUTREACH (OUTPATIENT)
Dept: OTHER | Age: 56
End: 2021-06-11

## 2021-06-11 NOTE — PROGRESS NOTES
10:48am Call received from patient regarding STD status. Patient reports she has spoken with CM at [facility] on 6/7/21, who was requesting additional documentation before approving STD claim. Patient is extremely frustrated and upset. Request any assistance ACM can provide to complete claim. Patient agreeable with ACM calling [employer], [facility] or physician as needed. Chart review completed, found documentation on 6/7/21 of request of note clarifying dates patient has been unable to work. Out of work note faxed stating dates out of work to be 4/2/21 to 6/22/21.     11:15am ACM placed call to [facility], per representative request received from [facility] requesting additional notes from attending physician, those notes were faxed to [facility] on 6/5/21. Representative unable to give any further details and advised to call [facility] or physician office. 11:27am ACM placed call to [employer], Missy Neri, detailed voicemail left requesting a return to call to patient or myself with status update. 11:37am ACM placed call to [facility], spoke with . Per  The notes received on 6/5/21 from [facility]/physician did not support patient being out of work from 4/2/21 until date of surgery of 4/21/21. States Missy Neri,  had called attending physician's office on 6/7/21 to request clarification of dates out of work. Reports out of work note with dates 4/2/21 to 6/22/21received by fax on 6/8/21. Per ,  requires 7 to 9 days to review all notes received and make a decision on STD claim, once a decision is made,if approved, Absence manager will be notified and claim will be paid per their protocol. 11:56am ACM placed call to patient to notify of above information. Patient appreciative of information, but also extremely upset and unhappy with process. Patient has received some assistance from son, but is concerned as bills are getting behind.  Patient was given information on applying for [employer] fund by telephone at last call, but has not yet called. Patient agreeable with referral to NEELAM Barrientos, [facility] team for any suggestions or assistance with finances. Patient also discouraged as she states original STD form completed with date of return to work 7/21/21 and now note sent to [facility] states 6/22/21. Patient reports she has a message out to Re[employer] this am due to new symptoms that started recently of pain and numbness in leg as prior to surgery. Discussed ongoing limitations of no lifting bending, prolonged sitting etc, patient reports she is adhering to restrictions, but is walking as directed. Advised to call and schedule follow up appointment regarding new symptoms and to clarify return to work date. Patient agreeable with plan. Chart will be forwarded to NEELAM for assistance and will plan follow up 3 to 5 days.

## 2021-06-14 ENCOUNTER — PATIENT OUTREACH (OUTPATIENT)
Dept: OTHER | Age: 56
End: 2021-06-14

## 2021-06-14 NOTE — PROGRESS NOTES
NEELAM PATEL contacted patient as referred by Care Coordinator, Julia Montes. Purpose of referral  Patient has not received STD. Patient concerned about affording household expenses. Call details  Patient expressed frustration about STD process. Patient stated that she was unsure where the STD process stood. NEELAM PATEL offered to conference call [employer] to receive clarification on the process and additional items that were needed. Ms. Saint Clair stated that she wanted to wait a week or so to see if she receives communication from [employer] or [email or web address]. Ms. Saint Clair stated that she had food for her family. The patient spoke of caring for her 3year old baby. Patient also stated that she has an adult son that is disabled that resides with her. Reportedly, the adult son is independent but felt like patient needed help caring for the baby. Patient stated that all of her bills are up to date due to receiving a stimulus payment. Plan of action  NEELAM [facility] will follow-up with patient in a week to reassess needs.

## 2021-06-22 ENCOUNTER — PATIENT OUTREACH (OUTPATIENT)
Dept: OTHER | Age: 56
End: 2021-06-22

## 2021-06-22 NOTE — PROGRESS NOTES
HPRP Follow Up. Telephone attempt to contact patient for HPRP Follow up. Left discreet message on voicemail with this CC contact information. Will plan follow up in 7 to 10 days. 2:11pm Patient returned call  Verified  and zip code with patient as identifiers. Patient is S/P L5 hemilaminectomy on 21  Patient reports return of back and buttock and leg pain as prior to surgery. States pain not as sever as before, but is limiting activity. She was seen by Dr. Trevon Ag on 21. See note below:  Assessment/Plan:     She is experiencing a continuing and progressive postoperative radiculitis which is likely representative of a recurrent disc herniation. She has had limited response to attempts at controlling her pain. Her pain is interfering with most activities of daily living. I would recommend MRI of the lumbar spine with contrast for further evaluation. MRI scheduled for 21. Patient states she received notice that STD was approved and is expecting payment on 21. No other concerns or questions, will plan follow up after MRI.     Assessment of clinical changes and knowledge demonstrated since last call:   Ongoing Plan of Care:   Goal:Demonstrates no signs of complications or red flags in 30 days;  21 Patient reports reoccurrence of back,hip and leg pain  · Review and discuss importance of monitoring and reporting red flags;  · Review medications and when taken with patient to ensure understanding;  · Educate patient when to call the physician or 911;  · Assess for any barriers with care access or mobility needs at home;  · Instruct patient to immediately notify the physician if: any red flag symptoms                Goal: Completes all FU appointments as ordered at [facility];  21 Seen by Sung Mckeon for 2 month post op on 21;scheduled for MRI 6/30  6/3/21 Patient compliant with all visits and follow up  21 Completed follow up with surgeon on 21;scheduled for surgery 4/21/21 4/5/21 Completed MRI and KELLY, has call into POC for follow up  3/29/21 Scheduled for MRI on 3/30/21 and Nerve Block 4/2/21  3/29/21 Completed appointment with Nam Lentz 3/26/21  · Reviewed DC instructions and FU appointments with patient;  · Verbalized appt with Orthopedic Surgeon on 03/26/21 @10am;       Review and discussion of plan of care with patient, who has provided input to plan, verbalized understanding and agrees with current goals. Any recurrence Red Flags or continued symptoms:See above    Medication Regimen Change:none  Completed a review of medications with patient, who verbalized understanding of how and when to take medications. Barriers / Adherence with medications:none    Upcoming Appointments:    Future Appointments   Date Time Provider [facility]   6/30/2021  3:30 [facility]       Patient asked questions appropriately and denied any additional needs at this time. Patient verbalized understanding of all information discussed. Patient has my name and contact information for any follow up needs or questions.      Plan next call:  After MRI

## 2021-06-30 ENCOUNTER — HOSPITAL ENCOUNTER (OUTPATIENT)
Dept: MRI IMAGING | Age: 56
Discharge: HOME | End: 2021-06-30
Attending: ORTHOPAEDIC SURGERY
Payer: COMMERCIAL

## 2021-06-30 DIAGNOSIS — M54.16 LUMBAR RADICULOPATHY: ICD-10-CM

## 2021-06-30 PROCEDURE — 74011636320 HC RX REV CODE- 636/320: Performed by: ORTHOPAEDIC SURGERY

## 2021-06-30 PROCEDURE — A9576 INJ PROHANCE MULTIPACK: HCPCS | Performed by: ORTHOPAEDIC SURGERY

## 2021-06-30 PROCEDURE — 72158 MRI LUMBAR SPINE W/O & W/DYE: CPT

## 2021-06-30 RX ORDER — SODIUM CHLORIDE 0.9 % (FLUSH) 0.9 %
10 SYRINGE (ML) INJECTION
Status: COMPLETED | OUTPATIENT
Start: 2021-06-30 | End: 2021-06-30

## 2021-06-30 RX ADMIN — GADOTERIDOL 17 ML: 279.3 INJECTION, SOLUTION INTRAVENOUS at 16:22

## 2021-06-30 RX ADMIN — Medication 10 ML: at 16:23

## 2021-07-01 ENCOUNTER — PATIENT OUTREACH (OUTPATIENT)
Dept: OTHER | Age: 56
End: 2021-07-01

## 2021-07-01 ENCOUNTER — DOCUMENTATION ONLY (OUTPATIENT)
Dept: ORTHOPEDIC SURGERY | Age: 56
End: 2021-07-01

## 2021-07-01 NOTE — PROGRESS NOTES
11:33am Patient called with update. At last call patient reported increased pain and numbness, same as prior to surgery. Patient was seen by Orthopedic surgeon and had repeat MRI on 6/30/21. Patient reports MD called today with report of reoccurring disc herniation at L5-S1 with impingement on S1 nerve root,  which correlates with return of symptoms. Maurice Butt has recommended nerve root block at L5-S1, and if no relief will need revision discectomy. Patient also reports she received news yesterday that her mother passed away yesterday and will be traveling to [place] for the service. Patient is trying to coordinate travel and receiving injection, feeling a little overwhelmed, but states she is doing ok. Reports pain and mobility is not as severe as prior to surgery, but is limiting function and causing disruption of sleep and usual activities. Plans to call [employer] with update as MD has stated return to work will be delayed until at least 8/30/21 and possibly longer if additional surgery is required. No other questions or concerns. Advised ACM available to assist as needed. Will plan follow up in 7 to 10 days.

## 2021-07-01 NOTE — PROGRESS NOTES
I been speaking to the niece by phone. She has had a significant return of lower extremity pain and paresthesias. Her pain is similar to how it was prior to surgery. I did send her for an MRI. I reviewed that study and there does appear to be a recurrent disc herniation on the right at L5-S1. It is causing S1 impingement. This correlates with her increase in symptoms. I recommended she try a right S1 selective nerve root block. We may need to return for a revision discectomy. Her mother passed away yesterday and that is going to delay some of the treatment as she has to travel to [place]. She is going to need to be out of work likely until August 30, and possibly longer depending on whether or not we need to go back for a revision discectomy.

## 2021-07-09 ENCOUNTER — PATIENT OUTREACH (OUTPATIENT)
Dept: OTHER | Age: 56
End: 2021-07-09

## 2021-07-09 NOTE — PROGRESS NOTES
Patient called with update. States she completed the [facility] on 21, reports only minimal short term relief, now with worsening pain in back and leg. Also very upset due to not being able to travel to [place] for her mother's  tomorrow. States her check was only enough to pay bills and no money left for travel. States she just feels very discouraged. Does not have follow up scheduled with Dr. Candice Gill. Advised to call and schedule follow up asap. Patient reports she will call today and schedule appointment. Also advised to see PCP due to concerns of depression. Ptient reports she is ok and does not want to take medication for depression. States she has prayed and is ok. [facility] offered to help with applying for \"[employer] Our [employer] \" hardship loan, patient states all bills are paid and nothing is past due, so will not qualify for loan. Discussed with patient seeing MD asap to start process for redo laminectomy if needed and looking ahead. Patient agrees and ready to be back to work and usual life. Will plan close follow up next week and assist as needed for scheduling and plans.

## 2021-07-12 ENCOUNTER — PATIENT OUTREACH (OUTPATIENT)
Dept: OTHER | Age: 56
End: 2021-07-12

## 2021-07-12 NOTE — PROGRESS NOTES
Care Manager contacted the patient by telephone in follow up. Verified  and zip code with patient as identifiers. Patient reports continued and worsening pain, states pain is almost as bad as prior to surgery. Spoke with Dr Christiano Escobar regarding another injection, or other plan states he feels best to proceed with redo laminectomy. Patient awaiting call back from his office with information. Denies any new symptoms. Patient has contacted [facility] and [facility] with update. Will follow up in 7 to 10 days.     Assessment of clinical changes and knowledge demonstrated since last call:   Ongoing Plan of Care:   Goal:Demonstrates no signs of complications or red flags in 30 days;  21 Reports worsening pain; awaiting information from surgeon regarding redo laminectomy  21 Patient reports reoccurrence of back,hip and leg pain  · Review and discuss importance of monitoring and reporting red flags;  · Review medications and when taken with patient to ensure understanding;  · Educate patient when to call the physician or 911;  · Assess for any barriers with care access or mobility needs at home;  · Instruct patient to immediately notify the physician if: any red flag symptoms                Goal: Completes all FU appointments as ordered at hospitals;  21 Patient contacted surgeon by phone;awaiting information from office  21 Seen by Gilson Gibson for 2 month post op on 21;scheduled for MRI 6/30  6/3/21 Patient compliant with all visits and follow up  21 Completed follow up with surgeon on 21;scheduled for surgery 21 Completed MRI and KELLY, has call into POC for follow up  3/29/21 Scheduled for MRI on 3/30/21 and Nerve Block 4/2/21  3/29/21 Completed appointment with Gilson Gibson 3/26/21  · Reviewed DC instructions and FU appointments with patient;  · Verbalized appt with Orthopedic Surgeon on 21 @10am;        Review and discussion of plan of care with patient, who has provided input to plan, verbalized understanding and agrees with current goals. Any recurrence Red Flags or continued symptoms:see above     Medication Regimen Change:none  Completed a review of medications with patient, who verbalized understanding of how and when to take medications. Barriers / Adherence with medications:none    Upcoming Appointments:  No future appointments. Patient asked questions appropriately and denied any additional needs at this time. Patient verbalized understanding of all information discussed. Patient has my name and contact information for any follow up needs or questions.      Plan next call: about 10 days

## 2021-07-20 RX ORDER — CEFAZOLIN SODIUM/WATER 2 G/20 ML
2 SYRINGE (ML) INTRAVENOUS ONCE
Status: CANCELLED | OUTPATIENT
Start: 2021-07-20 | End: 2021-07-20

## 2021-07-22 ENCOUNTER — PATIENT OUTREACH (OUTPATIENT)
Dept: OTHER | Age: 56
End: 2021-07-22

## 2021-07-22 NOTE — PROGRESS NOTES
[facility] Verified  and zip code with patient as identifiers. Patient reports continued and worsening pain and numbness in back and right leg. Was seen 21 by , with recommended repeat L5-S1 discectomy. Surgery is scheduled for 8/3/21 and patient will remain out of work due to unable to work at this time. Patient reports she has notified [facility] and Absence Manager at [facility]. No needs or concerns at this time. Will follow up after surgery. [facility] if needed prior to follow up. Office Visit from 21   Chief Complaint:  Radiating back and leg pain   History of Present Illness:   The patient returns today with persistent symptoms of lumbar radiculopathy despite a recent L5-S1 discectomy. The patient continues to have pain and paresthesias in the right posterior thigh, calf, and foot. She did have transient relief of symptoms after her index procedure. However, the new symptoms are equally as severe as those which she experienced preoperatively. She has not gotten any better despite oral steroids, pain medication, a right S1 selective nerve root block and a physician directed home exercise program.  A follow-up MRI did confirm a recurrent disc herniation. She is quite frustrated with the situation and would like to consider revision   MRI of the lumbar spine images were reviewed and interpreted: There appears to be a recurrent disc herniation on the right at L5-S1 with contact of the S1 nerve root  Assessment/Plan:     This patient's clinical history and physical exam is consistent with a recurrent disc herniation and recurrent radiculopathy. The imaging studies are concordant with the patient's symptoms. Conservative efforts have been reasonably exhausted and the patient feels like she cannot go on with the symptoms as they are.  We have previously discussed surgical options and now they would like to proceed with surgical scheduling.       Assessment of clinical changes and knowledge demonstrated since last call:   Ongoing Plan of Care:   Goal:Demonstrates no signs of complications or red flags in 30 days;  7/22/21 Reports recurrent herniated disc;scheduled for Redo discectomy on 8/3/21  7/12/21 Reports worsening pain; awaiting information from surgeon regarding redo laminectomy  6/22/21 Patient reports reoccurrence of back,hip and leg pain  · Review and discuss importance of monitoring and reporting red flags;  · Review medications and when taken with patient to ensure understanding;  · Educate patient when to call the physician or 911;  · Assess for any barriers with care access or mobility needs at home;  · Instruct patient to immediately notify the physician if: any red flag symptoms                Goal: Completes all FU appointments as ordered at DC;  7/22/21Completed all scheduled visits  7/12/21 Patient contacted surgeon by phone;awaiting information from office  6/22/21 Seen by Mariam Ricci for 2 month post op on 6/14/21;scheduled for MRI 6/30  6/3/21 Patient compliant with all visits and follow up  4/13/21 Completed follow up with surgeon on 4/12/21;scheduled for surgery 4/21/21 4/5/21 Completed MRI and KELLY, has call into POC for follow up  3/29/21 Scheduled for MRI on 3/30/21 and Nerve Block 4/2/21  3/29/21 Completed appointment with Mariam Ricci 3/26/21  · Reviewed DC instructions and FU appointments with patient;  · Verbalized appt with Orthopedic Surgeon on 03/26/21 @10am;          Review and discussion of plan of care with patient, who has provided input to plan, verbalized understanding and agrees with current goals. Any recurrence Red Flags or continued symptoms: See above     Medication Regimen Change:none  Completed a review of medications with patient, who verbalized understanding of how and when to take medications.       Barriers / Adherence with medications:none    Upcoming Appointments:    Future Appointments   Date Time Provider [facility]ula Hoover   7/27/2021  1:00 PM [facility] ASSESSMENT RM 02 [facility] OR PRE A       Patient asked questions appropriately and denied any additional needs at this time. Patient verbalized understanding of all information discussed. Patient has my name and contact information for any follow up needs or questions.      Plan next call: after surgery

## 2021-07-27 ENCOUNTER — HOSPITAL ENCOUNTER (OUTPATIENT)
Dept: SURGERY | Age: 56
Discharge: HOME | End: 2021-07-27
Payer: COMMERCIAL

## 2021-07-27 VITALS
BODY MASS INDEX: 27.03 KG/M2 | OXYGEN SATURATION: 98 % | WEIGHT: 182.5 LBS | RESPIRATION RATE: 18 BRPM | SYSTOLIC BLOOD PRESSURE: 142 MMHG | HEIGHT: 69 IN | DIASTOLIC BLOOD PRESSURE: 89 MMHG | HEART RATE: 70 BPM | TEMPERATURE: 97.8 F

## 2021-07-27 LAB
BACTERIA SPEC CULT: NORMAL
HGB BLD-MCNC: 11.1 G/DL (ref 11.7–15.4)
POTASSIUM SERPL-SCNC: 4.8 MMOL/L (ref 3.5–5.1)
SERVICE CMNT-IMP: NORMAL

## 2021-07-27 PROCEDURE — 85018 HEMOGLOBIN: CPT

## 2021-07-27 PROCEDURE — 87641 MR-STAPH DNA AMP PROBE: CPT

## 2021-07-27 PROCEDURE — 84132 ASSAY OF SERUM POTASSIUM: CPT

## 2021-07-27 RX ORDER — AZILSARTAN KAMEDOXOMIL AND CHLORTHALIDONE 40; 12.5 MG/1; MG/1
TABLET ORAL DAILY
COMMUNITY

## 2021-07-27 NOTE — PERIOP NOTES
Recent Results (from the past 12 hour(s))   MSSA/MRSA SC BY PCR, NASAL SWAB    Collection Time: 07/27/21  1:29 PM    Specimen: Swab   Result Value Ref Range    Special Requests: NO SPECIAL REQUESTS      Culture result:        SA target not detected. A MRSA NEGATIVE, SA NEGATIVE test result does not preclude MRSA or SA nasal colonization.    POTASSIUM    Collection Time: 07/27/21  1:29 PM   Result Value Ref Range    Potassium 4.8 3.5 - 5.1 mmol/L   HEMOGLOBIN    Collection Time: 07/27/21  1:29 PM   Result Value Ref Range    HGB 11.1 (L) 11.7 - 15.4 g/dL

## 2021-07-27 NOTE — PERIOP NOTES
PLEASE CONTINUE TAKING ALL PRESCRIPTION MEDICATIONS UP TO THE DAY OF SURGERY UNLESS OTHERWISE DIRECTED BELOW. DISCONTINUE all vitamins and supplements 7 days prior to surgery. DISCONTINUE Non-Steriodal Anti-Inflammatory (NSAIDS) such as Advil and Aleve 5 days prior to surgery. Home Medications to take  the day of surgery   Albuterol (Ventolin/ Pro-air) use and bring  Carvedilol (Coreg)    Montelukast (Singulair)  Pantoprazole (Protonix)        Home Medications   to [employer] am of surgery        Comments      On the day before surgery please take Acetaminophen 1000mg in the morning and then again before bed. You may substitute for Tylenol 650 mg. Please do not bring home medications with you on the day of surgery unless otherwise directed by your nurse. If you are instructed to bring home medications, please give them to your nurse as they will be administered by the nursing staff. If you have any questions, please call [facility] [phone] or [facility] [phone]. A copy of this note was provided to the patient for reference.

## 2021-07-27 NOTE — PERIOP NOTES
Patient verified name and     Order for consent was found in EHR and matches case posting; patient verified. Type 1B surgery, walk-in assessment complete. Labs per surgeon: MRSA;   Labs per anesthesia protocol: hgb, K+  EKG: not required    Patient states Covid-19 vaccine series completed. Second dose of Pfizer vaccine received on 21. At least 2 weeks have passed since final dose. Copy of patient's immunization card scanned under media. Hospital approved surgical skin cleanser and instructions given per hospital policy. Patient provided with and instructed on educational handouts including Guide to Surgery, Pain Management, Hand Hygiene, Blood Transfusion Education, and [place] [facility] Brochure. Patient answered medical/surgical history questions at their best of ability. All prior to admission medications documented in [facility]. Original medication prescription bottle not visualized during patient appointment. Patient instructed to hold all vitamins 7 days prior to surgery and NSAIDS 5 days prior to surgery, patient verbalized understanding. Patient teach back successful and patient demonstrates knowledge of instructions.

## 2021-07-29 NOTE — LETTER
Ms. Jero Garcia [address] [facility] [facility] [address] Dear Ms. Monico Yelena , 
 
My name is Jessica Lagunas LPN, Employee Care Manager for [facility] and I have been trying to reach you, for follow up after your recent procedure. The Employee Care Management [facility]) program is a free-of-charge confidential service provided to our employees and their family members covered by the [facility]. The program will provide an employee and his/her family with the [facility]' expertise to assist in navigating the health care delivery system, provider services, and their overall care needsso as to assure and improve health care interactions and enhance the quality of life. This program is designed to provide you with the opportunity to have a [facility] care manager partner with you for the following services: 
 
 1) when you come home from the hospital or emergency room 2) when help is needed to manage your disease 3) when you need assistance coordinating services or appointments [facility] is dedicated to empowering the good health of its community and improving the quality of care and care experiences for employees and their families. We are committed to safeguarding patient confidentiality and privacy, assuring that every employee has the respect he or she deserves in managing their health. The information shared with your care manager will not be shared with anyone else aside from those you identify as part of your care team, and will only be used to assist you with any identified care needs. Please contact me if you would like this service provided to you. Sincerely, Jessica Lagunas LPN  [facility] AND SURGERY [facility] Care Coordinator [address] [place] [address], [address] [place] [address] [address] [facility] Office Cell [phone] Fax [email or web address] None

## 2021-08-03 ENCOUNTER — HOSPITAL ENCOUNTER (OUTPATIENT)
Age: 56
Setting detail: OUTPATIENT SURGERY
Discharge: HOME | End: 2021-08-03
Attending: ORTHOPAEDIC SURGERY | Admitting: ORTHOPAEDIC SURGERY
Payer: COMMERCIAL

## 2021-08-03 ENCOUNTER — ANESTHESIA EVENT (OUTPATIENT)
Dept: SURGERY | Age: 56
End: 2021-08-03
Payer: COMMERCIAL

## 2021-08-03 ENCOUNTER — APPOINTMENT (OUTPATIENT)
Dept: GENERAL RADIOLOGY | Age: 56
End: 2021-08-03
Attending: ORTHOPAEDIC SURGERY
Payer: COMMERCIAL

## 2021-08-03 ENCOUNTER — ANESTHESIA (OUTPATIENT)
Dept: SURGERY | Age: 56
End: 2021-08-03
Payer: COMMERCIAL

## 2021-08-03 VITALS
WEIGHT: 182 LBS | HEART RATE: 57 BPM | BODY MASS INDEX: 26.88 KG/M2 | RESPIRATION RATE: 16 BRPM | DIASTOLIC BLOOD PRESSURE: 67 MMHG | OXYGEN SATURATION: 100 % | SYSTOLIC BLOOD PRESSURE: 130 MMHG | TEMPERATURE: 98.9 F

## 2021-08-03 DIAGNOSIS — M51.26 LUMBAR DISC HERNIATION: Primary | ICD-10-CM

## 2021-08-03 PROCEDURE — 74011250636 HC RX REV CODE- 250/636: Performed by: ORTHOPAEDIC SURGERY

## 2021-08-03 PROCEDURE — 77030034479 HC ADH SKN CLSR PRINEO J&J -B: Performed by: ORTHOPAEDIC SURGERY

## 2021-08-03 PROCEDURE — 77030028270 HC SRGFL HEMSTAT MTRX J&J -C: Performed by: ORTHOPAEDIC SURGERY

## 2021-08-03 PROCEDURE — 77030018673: Performed by: ORTHOPAEDIC SURGERY

## 2021-08-03 PROCEDURE — 74011000250 HC RX REV CODE- 250: Performed by: ORTHOPAEDIC SURGERY

## 2021-08-03 PROCEDURE — 74011000250 HC RX REV CODE- 250: Performed by: NURSE ANESTHETIST, CERTIFIED REGISTERED

## 2021-08-03 PROCEDURE — 77030040922 HC BLNKT HYPOTHRM STRY -A: Performed by: NURSE ANESTHETIST, CERTIFIED REGISTERED

## 2021-08-03 PROCEDURE — 76210000021 HC REC RM PH II 0.5 TO 1 HR: Performed by: ORTHOPAEDIC SURGERY

## 2021-08-03 PROCEDURE — 63042 LAMINOTOMY SINGLE LUMBAR: CPT | Performed by: ORTHOPAEDIC SURGERY

## 2021-08-03 PROCEDURE — 2709999900 HC NON-CHARGEABLE SUPPLY: Performed by: ORTHOPAEDIC SURGERY

## 2021-08-03 PROCEDURE — 74011250636 HC RX REV CODE- 250/636

## 2021-08-03 PROCEDURE — 74011250636 HC RX REV CODE- 250/636: Performed by: NURSE ANESTHETIST, CERTIFIED REGISTERED

## 2021-08-03 PROCEDURE — 76210000006 HC OR PH I REC 0.5 TO 1 HR: Performed by: ORTHOPAEDIC SURGERY

## 2021-08-03 PROCEDURE — 77030037088 HC TUBE ENDOTRACH ORAL NSL COVD-A: Performed by: NURSE ANESTHETIST, CERTIFIED REGISTERED

## 2021-08-03 PROCEDURE — 74011250637 HC RX REV CODE- 250/637: Performed by: ANESTHESIOLOGY

## 2021-08-03 PROCEDURE — 77030012894: Performed by: ORTHOPAEDIC SURGERY

## 2021-08-03 PROCEDURE — 76010000161 HC OR TIME 1 TO 1.5 HR INTENSV-TIER 1: Performed by: ORTHOPAEDIC SURGERY

## 2021-08-03 PROCEDURE — 77030039425 HC BLD LARYNG TRULITE DISP TELE -A: Performed by: NURSE ANESTHETIST, CERTIFIED REGISTERED

## 2021-08-03 PROCEDURE — 76060000033 HC ANESTHESIA 1 TO 1.5 HR: Performed by: ORTHOPAEDIC SURGERY

## 2021-08-03 PROCEDURE — 77030019908 HC STETH ESOPH SIMS -A: Performed by: NURSE ANESTHETIST, CERTIFIED REGISTERED

## 2021-08-03 PROCEDURE — 77030029099 HC BN WAX SSPC -A: Performed by: ORTHOPAEDIC SURGERY

## 2021-08-03 PROCEDURE — 77030025623 HC BUR RND PRECIS STRY -D: Performed by: ORTHOPAEDIC SURGERY

## 2021-08-03 PROCEDURE — 74011000272 HC RX REV CODE- 272: Performed by: ORTHOPAEDIC SURGERY

## 2021-08-03 PROCEDURE — 74011250636 HC RX REV CODE- 250/636: Performed by: ANESTHESIOLOGY

## 2021-08-03 PROCEDURE — 77030040361 HC SLV COMPR DVT MDII -B: Performed by: ORTHOPAEDIC SURGERY

## 2021-08-03 PROCEDURE — 74011250637 HC RX REV CODE- 250/637: Performed by: ORTHOPAEDIC SURGERY

## 2021-08-03 PROCEDURE — 77030031139 HC SUT VCRL2 J&J -A: Performed by: ORTHOPAEDIC SURGERY

## 2021-08-03 RX ORDER — VANCOMYCIN HYDROCHLORIDE 1 G/20ML
INJECTION, POWDER, LYOPHILIZED, FOR SOLUTION INTRAVENOUS AS NEEDED
Status: DISCONTINUED | OUTPATIENT
Start: 2021-08-03 | End: 2021-08-03 | Stop reason: HOSPADM

## 2021-08-03 RX ORDER — DEXAMETHASONE SODIUM PHOSPHATE 4 MG/ML
INJECTION, SOLUTION INTRA-ARTICULAR; INTRALESIONAL; INTRAMUSCULAR; INTRAVENOUS; SOFT TISSUE AS NEEDED
Status: DISCONTINUED | OUTPATIENT
Start: 2021-08-03 | End: 2021-08-03 | Stop reason: HOSPADM

## 2021-08-03 RX ORDER — GLYCOPYRROLATE 0.2 MG/ML
INJECTION INTRAMUSCULAR; INTRAVENOUS AS NEEDED
Status: DISCONTINUED | OUTPATIENT
Start: 2021-08-03 | End: 2021-08-03 | Stop reason: HOSPADM

## 2021-08-03 RX ORDER — DIPHENHYDRAMINE HYDROCHLORIDE 50 MG/ML
12.5 INJECTION, SOLUTION INTRAMUSCULAR; INTRAVENOUS
Status: DISCONTINUED | OUTPATIENT
Start: 2021-08-03 | End: 2021-08-03 | Stop reason: HOSPADM

## 2021-08-03 RX ORDER — NEOSTIGMINE METHYLSULFATE 1 MG/ML
INJECTION, SOLUTION INTRAVENOUS AS NEEDED
Status: DISCONTINUED | OUTPATIENT
Start: 2021-08-03 | End: 2021-08-03 | Stop reason: HOSPADM

## 2021-08-03 RX ORDER — CEFAZOLIN SODIUM/WATER 2 G/20 ML
2 SYRINGE (ML) INTRAVENOUS ONCE
Status: COMPLETED | OUTPATIENT
Start: 2021-08-03 | End: 2021-08-03

## 2021-08-03 RX ORDER — OXYCODONE AND ACETAMINOPHEN 10; 325 MG/1; MG/1
1 TABLET ORAL ONCE
Status: COMPLETED | OUTPATIENT
Start: 2021-08-03 | End: 2021-08-03

## 2021-08-03 RX ORDER — SODIUM CHLORIDE, SODIUM LACTATE, POTASSIUM CHLORIDE, CALCIUM CHLORIDE 600; 310; 30; 20 MG/100ML; MG/100ML; MG/100ML; MG/100ML
75 INJECTION, SOLUTION INTRAVENOUS CONTINUOUS
Status: DISCONTINUED | OUTPATIENT
Start: 2021-08-03 | End: 2021-08-03 | Stop reason: HOSPADM

## 2021-08-03 RX ORDER — MIDAZOLAM HYDROCHLORIDE 1 MG/ML
2 INJECTION, SOLUTION INTRAMUSCULAR; INTRAVENOUS
Status: DISCONTINUED | OUTPATIENT
Start: 2021-08-03 | End: 2021-08-03 | Stop reason: HOSPADM

## 2021-08-03 RX ORDER — OXYCODONE HYDROCHLORIDE 5 MG/1
10 TABLET ORAL
Status: DISCONTINUED | OUTPATIENT
Start: 2021-08-03 | End: 2021-08-03 | Stop reason: HOSPADM

## 2021-08-03 RX ORDER — BUPIVACAINE HYDROCHLORIDE AND EPINEPHRINE 5; 5 MG/ML; UG/ML
INJECTION, SOLUTION EPIDURAL; INTRACAUDAL; PERINEURAL AS NEEDED
Status: DISCONTINUED | OUTPATIENT
Start: 2021-08-03 | End: 2021-08-03 | Stop reason: HOSPADM

## 2021-08-03 RX ORDER — PROPOFOL 10 MG/ML
INJECTION, EMULSION INTRAVENOUS AS NEEDED
Status: DISCONTINUED | OUTPATIENT
Start: 2021-08-03 | End: 2021-08-03 | Stop reason: HOSPADM

## 2021-08-03 RX ORDER — OXYCODONE HYDROCHLORIDE 5 MG/1
5 TABLET ORAL
Status: DISCONTINUED | OUTPATIENT
Start: 2021-08-03 | End: 2021-08-03 | Stop reason: HOSPADM

## 2021-08-03 RX ORDER — ONDANSETRON 2 MG/ML
4 INJECTION INTRAMUSCULAR; INTRAVENOUS ONCE
Status: COMPLETED | OUTPATIENT
Start: 2021-08-03 | End: 2021-08-03

## 2021-08-03 RX ORDER — HYDROMORPHONE HYDROCHLORIDE 2 MG/ML
0.5 INJECTION, SOLUTION INTRAMUSCULAR; INTRAVENOUS; SUBCUTANEOUS
Status: COMPLETED | OUTPATIENT
Start: 2021-08-03 | End: 2021-08-03

## 2021-08-03 RX ORDER — HYDROCODONE BITARTRATE AND ACETAMINOPHEN 7.5; 325 MG/1; MG/1
1 TABLET ORAL
Qty: 30 TABLET | Refills: 0 | Status: SHIPPED | OUTPATIENT
Start: 2021-08-03 | End: 2021-08-08

## 2021-08-03 RX ORDER — LIDOCAINE HYDROCHLORIDE 20 MG/ML
INJECTION, SOLUTION EPIDURAL; INFILTRATION; INTRACAUDAL; PERINEURAL AS NEEDED
Status: DISCONTINUED | OUTPATIENT
Start: 2021-08-03 | End: 2021-08-03 | Stop reason: HOSPADM

## 2021-08-03 RX ORDER — FLUMAZENIL 0.1 MG/ML
0.2 INJECTION INTRAVENOUS AS NEEDED
Status: DISCONTINUED | OUTPATIENT
Start: 2021-08-03 | End: 2021-08-03 | Stop reason: HOSPADM

## 2021-08-03 RX ORDER — MIDAZOLAM HYDROCHLORIDE 1 MG/ML
INJECTION, SOLUTION INTRAMUSCULAR; INTRAVENOUS AS NEEDED
Status: DISCONTINUED | OUTPATIENT
Start: 2021-08-03 | End: 2021-08-03 | Stop reason: HOSPADM

## 2021-08-03 RX ORDER — NALOXONE HYDROCHLORIDE 0.4 MG/ML
0.1 INJECTION, SOLUTION INTRAMUSCULAR; INTRAVENOUS; SUBCUTANEOUS
Status: DISCONTINUED | OUTPATIENT
Start: 2021-08-03 | End: 2021-08-03 | Stop reason: HOSPADM

## 2021-08-03 RX ORDER — FENTANYL CITRATE 50 UG/ML
INJECTION, SOLUTION INTRAMUSCULAR; INTRAVENOUS AS NEEDED
Status: DISCONTINUED | OUTPATIENT
Start: 2021-08-03 | End: 2021-08-03 | Stop reason: HOSPADM

## 2021-08-03 RX ORDER — ONDANSETRON 2 MG/ML
INJECTION INTRAMUSCULAR; INTRAVENOUS AS NEEDED
Status: DISCONTINUED | OUTPATIENT
Start: 2021-08-03 | End: 2021-08-03 | Stop reason: HOSPADM

## 2021-08-03 RX ORDER — ACETAMINOPHEN 500 MG
1000 TABLET ORAL ONCE
Status: COMPLETED | OUTPATIENT
Start: 2021-08-03 | End: 2021-08-03

## 2021-08-03 RX ORDER — KETOROLAC TROMETHAMINE 30 MG/ML
INJECTION, SOLUTION INTRAMUSCULAR; INTRAVENOUS AS NEEDED
Status: DISCONTINUED | OUTPATIENT
Start: 2021-08-03 | End: 2021-08-03 | Stop reason: HOSPADM

## 2021-08-03 RX ORDER — LIDOCAINE HYDROCHLORIDE 10 MG/ML
0.1 INJECTION INFILTRATION; PERINEURAL AS NEEDED
Status: DISCONTINUED | OUTPATIENT
Start: 2021-08-03 | End: 2021-08-03 | Stop reason: HOSPADM

## 2021-08-03 RX ORDER — ONDANSETRON 2 MG/ML
INJECTION INTRAMUSCULAR; INTRAVENOUS
Status: COMPLETED
Start: 2021-08-03 | End: 2021-08-03

## 2021-08-03 RX ORDER — ROCURONIUM BROMIDE 10 MG/ML
INJECTION, SOLUTION INTRAVENOUS AS NEEDED
Status: DISCONTINUED | OUTPATIENT
Start: 2021-08-03 | End: 2021-08-03 | Stop reason: HOSPADM

## 2021-08-03 RX ADMIN — SODIUM CHLORIDE, POTASSIUM CHLORIDE, SODIUM LACTATE AND CALCIUM CHLORIDE 75 ML/HR: 600; 310; 30; 20 INJECTION, SOLUTION INTRAVENOUS at 15:09

## 2021-08-03 RX ADMIN — HYDROMORPHONE HYDROCHLORIDE 0.5 MG: 2 INJECTION INTRAMUSCULAR; INTRAVENOUS; SUBCUTANEOUS at 19:07

## 2021-08-03 RX ADMIN — MIDAZOLAM 2 MG: 1 INJECTION INTRAMUSCULAR; INTRAVENOUS at 17:20

## 2021-08-03 RX ADMIN — DEXAMETHASONE SODIUM PHOSPHATE 10 MG: 4 INJECTION, SOLUTION INTRAMUSCULAR; INTRAVENOUS at 17:35

## 2021-08-03 RX ADMIN — ONDANSETRON 4 MG: 2 INJECTION INTRAMUSCULAR; INTRAVENOUS at 20:06

## 2021-08-03 RX ADMIN — GLYCOPYRROLATE 0.6 MG: 0.2 INJECTION, SOLUTION INTRAMUSCULAR; INTRAVENOUS at 18:14

## 2021-08-03 RX ADMIN — HYDROMORPHONE HYDROCHLORIDE 0.5 MG: 2 INJECTION INTRAMUSCULAR; INTRAVENOUS; SUBCUTANEOUS at 18:50

## 2021-08-03 RX ADMIN — HYDROMORPHONE HYDROCHLORIDE 0.5 MG: 2 INJECTION INTRAMUSCULAR; INTRAVENOUS; SUBCUTANEOUS at 18:45

## 2021-08-03 RX ADMIN — Medication 4 MG: at 18:14

## 2021-08-03 RX ADMIN — FENTANYL CITRATE 100 MCG: 50 INJECTION INTRAMUSCULAR; INTRAVENOUS at 17:23

## 2021-08-03 RX ADMIN — ONDANSETRON 4 MG: 2 INJECTION INTRAMUSCULAR; INTRAVENOUS at 17:35

## 2021-08-03 RX ADMIN — OXYCODONE HYDROCHLORIDE AND ACETAMINOPHEN 1 TABLET: 10; 325 TABLET ORAL at 19:25

## 2021-08-03 RX ADMIN — KETOROLAC TROMETHAMINE 15 MG: 30 INJECTION, SOLUTION INTRAMUSCULAR at 18:22

## 2021-08-03 RX ADMIN — ROCURONIUM BROMIDE 40 MG: 10 INJECTION, SOLUTION INTRAVENOUS at 17:24

## 2021-08-03 RX ADMIN — HYDROMORPHONE HYDROCHLORIDE 0.5 MG: 2 INJECTION INTRAMUSCULAR; INTRAVENOUS; SUBCUTANEOUS at 18:58

## 2021-08-03 RX ADMIN — ACETAMINOPHEN 1000 MG: 500 TABLET ORAL at 15:06

## 2021-08-03 RX ADMIN — LIDOCAINE HYDROCHLORIDE 100 MG: 20 INJECTION, SOLUTION EPIDURAL; INFILTRATION; INTRACAUDAL; PERINEURAL at 17:24

## 2021-08-03 RX ADMIN — Medication 3 AMPULE: at 15:07

## 2021-08-03 RX ADMIN — CEFAZOLIN SODIUM 2 G: 100 INJECTION, POWDER, LYOPHILIZED, FOR SOLUTION INTRAVENOUS at 17:35

## 2021-08-03 RX ADMIN — PHENYLEPHRINE HYDROCHLORIDE 100 MCG: 10 INJECTION INTRAVENOUS at 18:01

## 2021-08-03 RX ADMIN — PROPOFOL 200 MG: 10 INJECTION, EMULSION INTRAVENOUS at 17:24

## 2021-08-03 NOTE — DISCHARGE INSTRUCTIONS
Wound Care and Showering  Your wound will typically be covered with a clear plastic dressing when you go home from the hospital. Since it is transparent, you will see the underlying gauze turn red with blood which is normal. You do not need to change the dressing unless it is leaking from the edges. Otherwise leave this dressing in place. The clear plastic dressing is waterproof so you can take a shower while it is on. You may remove the clear plastic dressing and the underlying gauze 3 days after surgery. There will be small tape strips under the gauze which should be left in place. If there is no leaking from the wound, you may take a shower and allow the tape strips to get wet. Some of them may fall off. The remaining strips will be removed once you return to the office. If there is persistent leaking when you first remove the clear dressing, apply new gauze and new clear plastic dressing (typically purchased at a pharmacy) over the wound. Hair washing is permissible in the shower. No tub baths, hot tubs or whirlpools until seen in the office. If any of the following should occur, please call the office:    -Persistent drainage from the incision site.  -Opening of incisions  -Fevers greater than 101 degrees  -Flu-like symptoms  -Increased redness    Exercise  You have unlimited walking and stair climbing privileges. Walking outside or walking on a treadmill without an incline is also allowed. Do NOT lift anything weighing greater than 10-15 lbs. Especially try to avoid lifting or reaching above your head. Sleeping  You may sleep in any comfortable position. Many patients find comfort sleeping in a recliner chair. It is normal to have difficulty sleeping for the first several weeks following your surgery. We recommend trying Benadryl, Melatonin, or Tylenol PM for help sleeping. All are over-the-counter and can be found in drugstores.      Eating  Because of the tubes in your throat while asleep during surgery, it is normal to have a sore throat and some difficulty swallowing solid foods after your surgery. This may persist for several weeks. Eating soft foods like yogurt, macaroni, and mashed potatoes seem to help. Pain  If you feel you need pain medicine, you may take regular or extra-strength Tylenol. Do NOT take an anti-inflammatory medication such as Advil, Aleve, or Motrin for the first 8 weeks following your surgery. Anti-inflammatory medications like these hinder bone growth and healing, which is critical in the weeks following surgery. Do NOT resume taking Foasamax for 8 weeks after your fusion surgery. To help alleviate persistent soreness around the shoulder blades, apply ice or warm moist compresses. Driving  You may NOT drive a car until told otherwise by your physician. You may be a passenger for short distances (about 20-30 minutes). If you must take a longer trip, be sure to make several pit stops so that you can walk and stretch your legs. Reclining in the passenger seat seems to be the most comfortable position for most patients. In some states, it is illegal to drive a car while wearing a neck brace. Follow up appointments  When you are discharged from the hospital, a follow up appointment will be made for 2-3 weeks from your surgery date. Call [phone] to confirm your appointment. Medication Interaction:  During your procedure you potentially received a medication or medications which may reduce the effectiveness of oral contraceptives. Please consider other forms of contraception for 1 month following your procedure if you are currently using oral contraceptives as your primary form of birth control. In addition to this, we recommend continuing your oral contraceptive as prescribed, unless otherwise instructed by your physician, during this time.     These are general instructions for a healthy lifestyle:  No smoking/ No tobacco products/ Avoid exposure to second hand smoke  Surgeon General's Warning:  Quitting smoking now greatly reduces serious risk to your health. Obesity, smoking, and sedentary lifestyle greatly increases your risk for illness  A healthy diet, regular physical exercise & weight monitoring are important for maintaining a healthy lifestyle    You may be retaining fluid if you have a history of heart failure or if you experience any of the following symptoms:  Weight gain of 3 pounds or more overnight or 5 pounds in a week, increased swelling in our hands or feet or shortness of breath while lying flat in bed. Please call your doctor as soon as you notice any of these symptoms; do not wait until your next office visit. After general anesthesia or intravenous sedation, for 24 hours or while taking prescription Narcotics:  · Limit your activities  · A responsible adult needs to be with you for the next 24 hours  · Do not drive and operate hazardous machinery  · Do not make important personal or business decisions  · Do not drink alcoholic beverages  · If you have not urinated within 8 hours after discharge, and you are experiencing discomfort from urinary retention, please go to the nearest ED. · If you have sleep apnea and have a CPAP machine, please use it for all naps and sleeping. · Please use caution when taking narcotics and any of your home medications that may cause drowsiness. *  Please give a list of your current medications to your Primary Care Provider. *  Please update this list whenever your medications are discontinued, doses are      changed, or new medications (including over-the-counter products) are added. *  Please carry medication information at all times in case of emergency situations. These are general instructions for a healthy lifestyle:  No smoking/ No tobacco products/ Avoid exposure to second hand smoke  Surgeon General's Warning:  Quitting smoking now greatly reduces serious risk to your health.   Obesity, smoking, and sedentary lifestyle greatly increases your risk for illness  A healthy diet, regular physical exercise & weight monitoring are important for maintaining a healthy lifestyle    You may be retaining fluid if you have a history of heart failure or if you experience any of the following symptoms:  Weight gain of 3 pounds or more overnight or 5 pounds in a week, increased swelling in our hands or feet or shortness of breath while lying flat in bed. Please call your doctor as soon as you notice any of these symptoms; do not wait until your next office visit. Dani Montano  or  Shelby Isabel.   Phone: [phone]  Fax: [phone]  [email or web address]

## 2021-08-03 NOTE — OP NOTES
[address] [address]   [phone]    OPERATIVE REPORT    Patient ID:Sharmila Mckeon  [id number]  1965  60 y.o. DATE OF SURGERY: 8/3/2021    SURGEON: Dr. Willow Linares. PREOPERATIVE DIAGNOSIS: Recurrent lumbar disc herniation    POSTOPERATIVE DIAGNOSIS: Recurrent lumbar disc herniation    PROCEDURE:    1. Re-exploration lumbar discectomy right  L5 through S1.     ANESTHESIA: General.    ESTIMATED BLOOD LOSS: 5 ml    POSTOPERATIVE CONDITION: Stable. INTRAOPERATIVE COMPLICATIONS: None. INDICATIONS FOR PROCEDURE: Back and leg pain consistent with claudication/lumbar radiculitis that is no longer responsive to conservative measures. Walking and standing tolerances have diminished. Imaging studies are concordant, showing lumbar stenosis in the area of a prior lumbar decompression. In the outpatient setting, the risks, benefits, and potential complications of the above listed procedure were discussed and an informed consent was obtained. DESCRIPTION OF PROCEDURE: After adequate induction of general anesthesia, the patient was positioned prone on the [facility] spinal table. Care was taken to pad all bony prominences. The shoulders and elbows were placed in the 90/90 position. The abdomen was allowed to hang free to decrease intraabdominal and venous pressure. The lumbar area was prepped and draped in the usual sterile fashion. Preoperative antibiotic was administered. A time out was called to confirm the appropriate patient, proposed procedure and proposed incision sites. With this conformation, an incision was created midline, over the lumbar spinous processes. Dissection was carried down to the lumbodorsal fascia. The lumbodorsal fascia and paraspinous musculature were elevated in a subperiosteal fashion, laterally off of the spinous processes and lamina on the right side.  A curet was slipped beneath the lamina and a cross table fluoroscopic image was obtained to identify the appropriate spinal level. The pars interarticularis was exposed. Care was taken to preserve the facet capsule at each level. The deep retractors were placed to facilitate visualization. The 4 mm la was used to thin the remaining lamina to an eggshell like thickness. The operative microscope was brought to the field and used to visualize the neural elements during the decompression. A triple-0 angled curet was used to elevated the scar off of its origin on the caudal surface of the L5 lamina as well as off the cephalad surface of the adjacent lamina. The scar was elevated from the thecal sac and a plane was created in the epidural space with the [facility]. A 4 mm Kerrison was used to perform a revision right hemilaminectomy of  L5 - S1 . The hemilaminectomy was widened to the medial border of the pedicle. A 4 mm Kerrison was used to under cut the lateral recess. The nerve root was retracted medially again with the Sarah nerve root retractor. The underlying disk was identified and inspected. A recurrent disc herniation was noted and a revision discectomy performed. The nerve was released from retraction and the area inspected and palpated with a nerve hook for adequacy of decompression. This was satisfactory. There was felt to be no significant facet instability and a fusion was not deemed to be necessary. With the decompression completed, the wound was liberally irrigated with saline solution. Floseal and vancomycin were spread. The lumbodorsal fascia was approximated with a #1 Vicryl suture in an interrupted fashion. The subcutaneous tissue and skin were approximated in a layered fashion. Dermabond was applied. Sterile dressings were applied. The patient tolerated the procedure well and was returned to the postanesthesia care unit in stable condition. At the end of the case, all sponge, needle, and instrument counts were correct.        Chase Waggoner MD

## 2021-08-03 NOTE — ANESTHESIA POSTPROCEDURE EVALUATION
Procedure(s):  REVISION RIGHT L5-S1 MICRODISCECTOMY.     general    Anesthesia Post Evaluation      Multimodal analgesia: multimodal analgesia not used between 6 hours prior to anesthesia start to PACU discharge  Patient location during evaluation: PACU  Patient participation: complete - patient participated  Level of consciousness: awake and alert  Pain management: adequate  Airway patency: patent  Anesthetic complications: no  Cardiovascular status: hemodynamically stable  Respiratory status: acceptable  Hydration status: acceptable        INITIAL Post-op Vital signs:   Vitals Value Taken Time   /66 08/03/21 1904   Temp 36.4 °C (97.5 °F) 08/03/21 1837   Pulse 49 08/03/21 1904   Resp 14 08/03/21 1900   SpO2 100 % 08/03/21 1904

## 2021-08-04 ENCOUNTER — PATIENT OUTREACH (OUTPATIENT)
Dept: OTHER | Age: 56
End: 2021-08-04

## 2021-08-04 NOTE — PROGRESS NOTES
Patient eligible for [employer] Employee care management. Received notification of outpatient surgery at [facility] on 8/3/21 for Revision L5-S1 Microdiscectomy. Care Manager contacted the patient, verified  and zip code as identifiers. Patient is current with CM since 3/25/21. Care management discharge assessment completed. PMH:   Past Medical History:   Diagnosis Date    Anemia     hx of taking oral iron- resolved- denies hx of blood transfusions    Arthritis     Bulging of cervical intervertebral disc     Chronic pain     right shoulder     GERD (gastroesophageal reflux disease)     well controlled with med    History of cervical cancer ? pt unsure    s/p hyst    Hypertension     managed with med    Joint pain 2013    Menopause     Sickle cell trait ([facility] [address]          61 yo patient who is 1 day(s) s/p Revision L5-S1 Microdiscectomy. Patient had original L5-S1 discectomy on 21, was doing well until late  when back pain with radiating leg pain and numbness started again. Had repeat MRI showing reoccurring HNP, tried KELLY with no releif in pain and was scheduled for 2nd surgery on 8/3/21. ACM spoke with patient who reports she is doing ok. States nerve pain and numbness has resolved still with slight residual numbness in side of foot. Post op pain controlled with Extra Strength Tylenol and muscle relaxer. Denies any fever, N/V or other complications. Tolerating diet, voiding well, no BM today. Reviewed post op instructions and cautioned regarding no bending, twisting or lifting. Patient has custody of 3 yo grandson, states her son is helping take care of grandson and she is being cautious. Ambulating frequently without assistive device. Reports headache earlier and BP slightly elevated this am and bp cuff gave report of irregular heart beat. Felt due to frustration and pain as son had to leave for an appointment and she was trying to care for grandson.  Advised to take medication and rest and then recheck BP. Report to MD if still elevated. Patient agreeable and by end of call states no further headache. Scheduled for follow up with surgeon 8/20/21. Surgical/Wound Condition Focused Assessment    Skin- wounds or incisions? yes  Description and location of wound- low back  In the last 24 hour have you experienced; Fever no    Low body temperature no    Chills or shaking no    Sweating no    Fast heart rate no    Fast breathing no    Dizziness/lightheadedness no    Confusion or unusual change in mental status no    Diarrhea no    Nausea no    Vomiting no    Shortness of breath or difficulty breathing no    Less urine output no    Cold, clammy, and pale skin no     Skin rash or skin color changes no  New or worsening pain? yes  If yes, pain rated 0-10: 4-5 Location/pain characteristics: low back   New or worsening numbness or tingling? no    Activity level- moving several times a day, or as recommended? yes  Activity restrictions postop:Reviewed     Bathing and showering instructions? yes  Nutrition- prescribed diet? yes   Tolerating food? yes    Does patient have incentive spirometer? no       Postop Red Flags  Call you doctor now or seek immediate medical attention if:  · You pass out, lose consciousness;  · You have severe trouble breathing;  · You have sudden chest pain and shortness of breath;  · You cough up blood;  · You have severe belly pain;  · You are sick to your stomach and cannot hold down fluids. · You have trouble passing urine;  · You have pain that does not get better after taking your pain medications  · You have increased weakness, or numbness in your foot or toes  · You have loose stitches, or your incision comes open.   · You have signs of a blood clot such as:  · Pain in your calf, back of the knee, or groin;  · Redness and swelling in your leg or groin;  · You develop signs of an infection:  · Increased pain, swelling or redness at wound site;  · Pus draining from the wound site or on dressing;  · A fever;  · Notice a foul odor from the wound; Initial Plan of Care   Goal:Demonstrates no signs of complications or red flags within 30 days post op  8/4/21 Revision discectomy 8/3/21-doing well  7/22/21 Reports recurrent herniated disc;scheduled for Redo discectomy on 8/3/21  7/12/21 Reports worsening pain; awaiting information from surgeon regarding redo laminectomy  6/22/21 Patient reports reoccurrence of back,hip and leg pain  · Review and discuss importance of monitoring and reporting red flags;  · Review medications and when taken with patient to ensure understanding;  · Educate patient when to call the physician or 911;  · Assess for any barriers with care access or mobility needs at home;  · Instruct patient to immediately notify the physician if: any red flag symptoms                Goal: Completes all FU appointments as ordered at hospitals;  8/4/21 Revision Surgery 8/3/21  7/22/21Completed all scheduled visits  7/12/21 Patient contacted surgeon by phone;awaiting information from office  6/22/21 Seen by Kamari Richard for 2 month post op on 6/14/21;scheduled for MRI 6/30  6/3/21 Patient compliant with all visits and follow up  4/13/21 Completed follow up with surgeon on 4/12/21;scheduled for surgery 4/21/21 4/5/21 Completed MRI and KELLY, has call into POC for follow up  3/29/21 Scheduled for MRI on 3/30/21 and Nerve Block 4/2/21  3/29/21 Completed appointment with Kamari Richard 3/26/21  · Reviewed DC instructions and FU appointments with patient;  · Verbalized appt with Orthopedic Surgeon on 03/26/21 @10am;             Review and discussion of initial plan of care with patient, who has provided input to plan, verbalized understanding and agrees with current goals.       Potential Barriers to Self-Management or Access:   []  Decline in memory    []  Language barrier  [x]  Emotional   [x]  Limited mobility  []  Lack of motivation     [] Vision, hearing or cognitive impairment  [] Knowledge    [] Financial Barriers    [x]  Pain    []  Other     Discharge Instructions:  Reviewed discharge instructions with patient. Patient verbalizes understanding of discharge instructions and importance of follow-up care. Medications:   New Medications at Discharge: Norco  Changed Medications at Discharge: none  Discontinued Medications at Discharge: none    Current Outpatient Medications   Medication Sig    HYDROcodone-acetaminophen (NORCO) 7.5-325 mg per tablet Take 1 Tablet by mouth every four (4) hours as needed for Pain for up to 5 days. Max Daily Amount: 6 Tablets.  azilsartan med-chlorthalidone (Edarbyclor) 40-12.5 mg tab Take  by mouth daily.  montelukast (SINGULAIR) 10 mg tablet TAKE 1 TABLET BY MOUTH EVERY DAY    carvediloL (COREG) 12.5 mg tablet Take 1 Tab by mouth two (2) times daily (with meals).  potassium chloride (K-DUR, KLOR-CON) 20 mEq tablet Take 1 Tab by mouth daily.  celecoxib (CELEBREX) 200 mg capsule Take 1 Cap by mouth two (2) times a day.  pantoprazole (PROTONIX) 40 mg tablet TAKE 1 TABLET BY MOUTH ONE TIME A DAY    albuterol (PROVENTIL HFA, VENTOLIN HFA, PROAIR HFA) 90 mcg/actuation inhaler every four (4) hours as needed.  ergocalciferol (ERGOCALCIFEROL) 1,250 mcg (50,000 unit) capsule TAKE ONE CAPSULE BY MOUTH MONTHLY (Patient taking differently: every month. TAKE ONE CAPSULE BY MOUTH MONTHLY)    Pazeo 0.7 % drop Put I drop in each eye 3 x daily (Patient taking differently: Put I drop in each eye 3 x daily as needed)    fluticasone propionate (FLONASE) 50 mcg/actuation nasal spray 1 [place] by Both Nostrils route two (2) times a day. (Patient taking differently: 1 Spray by Both Nostrils route two (2) times daily as needed.)    acetaminophen (TYLENOL ARTHRITIS PAIN) 650 mg TbER Take 650 mg by mouth three (3) times daily as needed. No current facility-administered medications for this visit.        Completed a review of medications with patient, who verbalized understanding of how and when to take medications. Barriers to Medication Adherence:  none    Future Appointments   Date Time Provider Ursula Sneha   8/20/2021  8:45 AM MD COY Lobato       Patient verbalized understanding of all information discussed. Pt has my contact information for any further questions, concerns, or needs. Plan for next call: Will ask team member to follow up in 5 to 7 days while ACM away.  ACM will follow up after 8/17/21

## 2021-08-11 ENCOUNTER — PATIENT OUTREACH (OUTPATIENT)
Dept: OTHER | Age: 56
End: 2021-08-11

## 2021-08-11 NOTE — PROGRESS NOTES
[facility] follow up    * Covering for [address], LPN    Contacted patient for Health Promotion and Risk Prevention services. Verified  and Zipcode for HIPAA security. Introduced myself as covering for Bravo CarlitosFlora, LPN    Patient on with  [facility] OP visit for Revision L5-S1 Microdiscectomy with D/C 8/3/21 . *Spoke with patient who reports that she is doing ok. Still experiencing some pain. Pain is controlled with Extra Strength Tylenol and Flexeril. Patient also states that she has residual numbness in her Right Foot and Leg but admits that she is noticing some improvement. *Patient states that she voiding well. Finally had a BM on 21 and every day since. BP has been good. Thinks stress and pain caused it to be elevated. *Patient states her Post OP Appointment is 21 @ 8:45a w/ Gabriele Patten MD - Ortho Surgery.       Initial Plan of Care   Goal:Demonstrates no signs of complications or red flags within 30 days post op  21 - Patient denies any s/s of Infection or Red Flag Symptoms  21 Revision discectomy 8/3/21-doing well  21 Reports recurrent herniated disc;scheduled for Redo discectomy on 8/3/21  7/12/21 Reports worsening pain; awaiting information from surgeon regarding redo laminectomy  21 Patient reports reoccurrence of back,hip and leg pain  · Review and discuss importance of monitoring and reporting red flags;  · Review medications and when taken with patient to ensure understanding;  · Educate patient when to call the physician or 911;  · Assess for any barriers with care access or mobility needs at home;  · Instruct patient to immediately notify the physician if: any red flag symptoms                Goal: Completes all FU appointments as ordered at [facility]  21 - Patient states her Post OP Appointment is 21 @ 8:45a w/ Gabriele Patten MD - Ortho Surgery.   21 Revision Surgery 8/3/21  7/22/21Completed all scheduled visits  7/12/21 Patient contacted surgeon by phone;awaiting information from office  6/22/21 Seen by Devi Raphael for 2 month post op on 6/14/21;scheduled for MRI 6/30  6/3/21 Patient compliant with all visits and follow up  4/13/21 Completed follow up with surgeon on 4/12/21;scheduled for surgery 4/21/21 4/5/21 Completed MRI and KELLY, has call into POC for follow up  3/29/21 Scheduled for MRI on 3/30/21 and Nerve Block 4/2/21  3/29/21 Completed appointment with Devi Raphael 3/26/21  · Reviewed DC instructions and FU appointments with patient;  · Verbalized appt with Orthopedic Surgeon on 03/26/21 @10am;     Patient had no other questions or concerns.  Contact information provided and reminded her that I can be reached if any needs rise and she is unable to reach [address], LPN      Next outreach: per Bravo Chery LPN

## 2021-08-23 ENCOUNTER — PATIENT OUTREACH (OUTPATIENT)
Dept: OTHER | Age: 56
End: 2021-08-23

## 2021-08-23 NOTE — PROGRESS NOTES
Care Manager contacted the patient by telephone in follow up. Verified  and zip code with patient as identifiers. Patient is 59 yo patient who is s/p Revision L5-S1 Microdiscectomy on 8/3/21. Patient had original L5-S1 discectomy on 21, was doing well until late , when back pain with radiating leg pain and numbness reoccurred, leading to need for revision surgery. ACM spoke with patient, who reports some improvement in symptoms but making progress. States trying to walk as much as possible, but limited by pain and numbness. Rates pain 10/10 with increased activity and 4/10 at rest. Only taking ES Tylenol and muscle relaxer for pain control. Denies any fever or signs of infection. Completed post op visit on 21, with no concerns identified, scheduled for next post op in 6 weeks. Patient concerned as he has talked to [employer] who informed of possible termination after 6 months of absence from work. Patient will reach 6 months out of work 21 and is not scheduled to return to work until 21. 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 Patient has discussed LTD with [employer]. Forms to be mailed to patient to begin process. No other questions or concerns today, will plan next follow up in 2 to 3 weeks.        Inpatient Readmission Risk score: No data recorded    Assessment of clinical changes and knowledge demonstrated since last call:   Ongoing Plan of Care:    Goal:Demonstrates no signs of complications or red flags within 30 days post op  21 Patient denies any signs of infection or other complications   - Patient denies any s/s of Infection or Red Flag Symptoms  21 Revision discectomy 8/3/21-doing well  21 Reports recurrent herniated disc;scheduled for Redo discectomy on 8/3/21  7/12/21 Reports worsening pain; awaiting information from surgeon regarding redo laminectomy  6/22/21 Patient reports reoccurrence of back,hip and leg pain  · Review and discuss importance of monitoring and reporting red flags;  · Review medications and when taken with patient to ensure understanding;  · Educate patient when to call the physician or 911;  · Assess for any barriers with care access or mobility needs at home;  · Instruct patient to immediately notify the physician if: any red flag symptoms                Goal: Completes all FU appointments as ordered at DC  8/23/21 Completed post op visit on 8/20/21; Next post op in 6 weeks  8/11/21 - Patient states her Post OP Appointment is Friday 8/20/21 @ 8:45a w/ Regis Saenz MD - Ortho Surgery. 8/4/21 Revision Surgery 8/3/21  7/22/21Completed all scheduled visits  7/12/21 Patient contacted surgeon by phone;awaiting information from office  6/22/21 Seen by Pauline Tyson for 2 month post op on 6/14/21;scheduled for MRI 6/30  6/3/21 Patient compliant with all visits and follow up  4/13/21 Completed follow up with surgeon on 4/12/21;scheduled for surgery 4/21/21 4/5/21 Completed MRI and KELLY, has call into POC for follow up  3/29/21 Scheduled for MRI on 3/30/21 and Nerve Block 4/2/21  3/29/21 Completed appointment with Pauline Tyson 3/26/21  · Reviewed DC instructions and FU appointments with patient;  · Verbalized appt with Orthopedic Surgeon on 03/26/21 @10am;          Review and discussion of plan of care with patient, who has provided input to plan, verbalized understanding and agrees with current goals. Any recurrence Red Flags or continued symptoms: See above     Medication Regimen Change:none  Completed a review of medications with patient, who verbalized understanding of how and when to take medications.       Barriers / Adherence with medications:none    Upcoming Appointments:  6 week post op visit with Dr Stephanie Cuevas    Patient asked questions appropriately and denied any additional needs at this time. Patient verbalized understanding of all information discussed. Patient has my name and contact information for any follow up needs or questions.      Plan next call:2 to 3 weeks

## 2021-09-08 ENCOUNTER — PATIENT OUTREACH (OUTPATIENT)
Dept: OTHER | Age: 56
End: 2021-09-08

## 2021-09-08 NOTE — PROGRESS NOTES
Care Manager contacted the patient by telephone in follow up. Verified  and zip code with patient as identifiers. Patient is [phone] patient who is s/p Revision L5-S1 Microdiscectomy on 8/3/21.  Patient had original L5-S1 discectomy on 21, was doing well until late , when back pain with radiating leg pain and numbness reoccurred, leading to need for revision surgery    Patient reports she is doing ok, making slow progress. States continues to have some pain and spasm in leg and states foot is still numb. Also complains of clavicle pain since surgery, possibly related to positioning during surgery. Walking as much as she can tolerate. Managing activities without problems. States has sinus/cold symptoms for several days, tested negative for Covid 19. No other concerns or questions.  Plans to call and schedule follow up with Dr Fernanda Martinez Readmission Risk score: No data recorded    Assessment of clinical changes and knowledge demonstrated since last call:   Ongoing Plan of Care:    Goal:Demonstrates no signs of complications or red flags within 30 days post op  21 Denies any signs of infection or other complications   Patient denies any signs of infection or other complications   - Patient denies any s/s of Infection or Red Flag Symptoms  21 Revision discectomy 8/3/21-doing well  21 Reports recurrent herniated disc;scheduled for Redo discectomy on 8/3/21  7/12/21 Reports worsening pain; awaiting information from surgeon regarding redo laminectomy  21 Patient reports reoccurrence of back,hip and leg pain  · Review and discuss importance of monitoring and reporting red flags;  · Review medications and when taken with patient to ensure understanding;  · Educate patient when to call the physician or 911;  · Assess for any barriers with care access or mobility needs at home;  · Instruct patient to immediately notify the physician if: any red flag symptoms                Goal: Completes all FU appointments as ordered at DC  9/8/21 Plans to call and schedule post op visit  8/23/21 Completed post op visit on 8/20/21; Next post op in 6 weeks  8/11/21 - Patient states her Post OP Appointment is Friday 8/20/21 @ 8:45a w/ Anitra Mcdermott MD - Ortho Surgery. 8/4/21 Revision Surgery 8/3/21  7/22/21Completed all scheduled visits  7/12/21 Patient contacted surgeon by phone;awaiting information from office  6/22/21 Seen by Astrid Oates for 2 month post op on 6/14/21;scheduled for MRI 6/30  6/3/21 Patient compliant with all visits and follow up  4/13/21 Completed follow up with surgeon on 4/12/21;scheduled for surgery 4/21/21 4/5/21 Completed MRI and KELLY, has call into POC for follow up  3/29/21 Scheduled for MRI on 3/30/21 and Nerve Block 4/2/21  3/29/21 Completed appointment with Astrid Oates 3/26/21  · Reviewed DC instructions and FU appointments with patient;  · Verbalized appt with Orthopedic Surgeon on 03/26/21 @10am;          Review and discussion of plan of care with patient, who has provided input to plan, verbalized understanding and agrees with current goals. Any recurrence Red Flags or continued symptoms:see above     Medication Regimen Change:none  Completed a review of medications with patient, who verbalized understanding of how and when to take medications. Barriers / Adherence with medications:none    Upcoming Appointments:  No future appointments. Patient asked questions appropriately and denied any additional needs at this time. Patient verbalized understanding of all information discussed. Patient has my name and contact information for any follow up needs or questions.      Plan next call: about 2 weeks

## 2021-09-24 ENCOUNTER — PATIENT OUTREACH (OUTPATIENT)
Dept: OTHER | Age: 56
End: 2021-09-24

## 2021-09-24 NOTE — PROGRESS NOTES
Care Manager contacted the patient by telephone in follow up. Verified  and zip code with patient as identifiers. Patient AC 57 SF patient who is s/p Revision L5-S1 Microdiscectomy on 8/3/21.  Patient had original L5-S1 discectomy on 21, was doing well until late , when back pain with radiating leg pain and numbness reoccurred, leading to need for revision surgery  Patient reports continued slow progress. States back and leg pain are improving, she is trying to walk as much as possible, but limited due to increased knee pain (history of arthritis of knees, with injections in past). Patient very concerned as she received letter of termination from employment with [employer], due to out of work for 6 months as of 21. Per patient she has been approved for LTD, but unsure of next steps to take. ACM called and spoke with Michi RICHTER,JORGE. Evy Benoit will follow up with patient next week for further assistance. Inpatient Readmission Risk score: No data recorded    Assessment of clinical changes and knowledge demonstrated since last call:   Ongoing Plan of Care:   Goal:Demonstrates no signs of complications or red flags within 30 days post op  85FI signs of complications from surgery, making slow progress.  Reports reoccurring knee pain from hx of arthritis   21 Denies any signs of infection or other complications   Patient denies any signs of infection or other complications  43 - Patient denies any s/s of Infection or Red Flag Symptoms  21 Revision discectomy 8/3/21-doing well  21 Reports recurrent herniated disc;scheduled for Redo discectomy on 8/3/21  7/12/21 Reports worsening pain; awaiting information from surgeon regarding redo laminectomy  21 Patient reports reoccurrence of back,hip and leg pain  · Review and discuss importance of monitoring and reporting red flags;  · Review medications and when taken with patient to ensure understanding;  · Educate patient when to call the physician or 911;  · Assess for any barriers with care access or mobility needs at home;  · Instruct patient to immediately notify the physician if: any red flag symptoms                Goal: Completes all FU appointments as ordered at [facility]  9/24/21 Patient to call and schedule 6 week post op and appointment for knee pain  9/8/21 Plans to call and schedule post op visit  8/23/21 Completed post op visit on 8/20/21; Next post op in 6 weeks  8/11/21 - Patient states her Post OP Appointment is Friday 8/20/21 @ 8:45a w/ Yunier Wynne MD - Ortho Surgery. 8/4/21 Revision Surgery 8/3/21  7/22/21Completed all scheduled visits  7/12/21 Patient contacted surgeon by phone;awaiting information from office  6/22/21 Seen by Mariam Ricci for 2 month post op on 6/14/21;scheduled for MRI 6/30  6/3/21 Patient compliant with all visits and follow up  4/13/21 Completed follow up with surgeon on 4/12/21;scheduled for surgery 4/21/21 4/5/21 Completed MRI and KELLY, has call into POC for follow up  3/29/21 Scheduled for MRI on 3/30/21 and Nerve Block 4/2/21  3/29/21 Completed appointment with Mariam Ricci 3/26/21  · Reviewed DC instructions and FU appointments with patient;  · Verbalized appt with Orthopedic Surgeon on 03/26/21 @10am;        Review and discussion of plan of care with patient, who has provided input to plan, verbalized understanding and agrees with current goals. Any recurrence Red Flags or continued symptoms:none     Medication Regimen Change:none  Completed a review of medications with patient, who verbalized understanding of how and when to take medications. Barriers / Adherence with medications:none    Upcoming Appointments:  Patient to call and schedule 6 week post op visit    Patient asked questions appropriately and denied any additional needs at this time. Patient verbalized understanding of all information discussed.   Patient has my name and contact information for any follow up needs or questions.      Plan next call:   About 1 to 2 weeks

## 2021-09-24 NOTE — PROGRESS NOTES
NEELAM PATEL received a referral from Care Manager, Elizabeth Pearson, to follow-up with patient regarding health insurance options. NEELAM PATEL contacted patient. Patient identifiers confirmed, zip code and . Patient concern  Health insurance options    TC Details    Employment  Patient was terminated from her position at [employer] because she was unable to return to work within 6 months due to medical challenges. Income  Patient was approved to receive LTD. Health insurance  Patient will continue to receive health insurance through [facility] until the end of 2021. Patrick Ortiz is not affordable. Patient has two dependents, a son and grandson who both have Medicaid. Patient is interested in health insurance for herself. Plan of action  Review health care options through [address]. Patient was informed of plans with 0 monthly premiums and 0 deductibles. Patient was provided a listing of potential low cost plans and urged to contact her provider to inquire if they accepted the plan. Patient was provided the Healthcare Marketplace website to research additional options, along with directions. NEELAM PATEL will follow-up with patient next week.

## 2021-09-24 NOTE — PATIENT INSTRUCTIONS
Healthcare Marketplace  Rent [email or web address]    Click on [employer] on [address] and [address]

## 2021-09-27 ENCOUNTER — PATIENT OUTREACH (OUTPATIENT)
Dept: OTHER | Age: 56
End: 2021-09-27

## 2021-09-27 NOTE — PROGRESS NOTES
NEELAM PATEL received TC from patient. Patient identifiers confirmed, zip code and . Purpose of TC  Health insurance options. TC details  Ms. Bony Severino stated that she did not fully understand her health insurance options through healthcare.gov. NEELAM PATEL walked her through the website while she was on her tablet. Patient was able to see her options and costs. The most cost effective options were from [facility]. They offered plans with  $0 monthly premiums, $0 deductible $1200 out of pocket maximum. Patient PCP, Dr. Sophia Menendez did not accept this plan. Patient stated that she did not want to sign up with a plan that her PCP did not participate. There was also a [employer], but Dr. Sophia Menendez did not participate in that either. Ms. Bony Severino stated that she was interested in applying for Medicaid. NEELAM PATEL informed Ms. Albert of the criteria of Medicaid which she does not currently meet. NEELAM PATEL informed patient that COBRA will be available, however it may not be affordable. Patient expressed frustration with this issue. NEELAM PATEL informed patient that other insurance avenues will be explored. Plan of action  Explore other possible health insurance options.

## 2021-09-27 NOTE — PROGRESS NOTES
NEELAM PATEL received email from patient stating that she did not know what health insurance plan to choose. NEELAM PATEL attempted to contact patient to further discuss health insurance options. No answer. VM left. NEELAM PATEL will call patient back in a few days if call is not returned.

## 2021-09-28 ENCOUNTER — PATIENT OUTREACH (OUTPATIENT)
Dept: OTHER | Age: 56
End: 2021-09-28

## 2021-09-28 NOTE — PROGRESS NOTES
NEELAM PATEL contacted patient for follow-up. Patient identifiers confirmed, zip code and . Purpose of TC: Discuss health care options    TC details:    Ms. Sekou Jeffery stated that she had medical bills from [facility] that she was unable to pay. NEELAM PATEL provided patient the link to apply for the [address] Application  [email or web address]-[email or web address] [email or web address]?la=en    Ms. Sekou Jeffery stated that she wanted to continue to receive services through [facility]. NEELAM PATEL provided with the financial assistance application for [employer] to complete and submit. [employer] Financial Assistance Application    [email or web address]    Ms. Sekou Jeffery stated that she was unsure if she would be able to return to work after her back heals due to other ailments. NEELAM PATEL informed patient that she could apply for Social Security Disability. Patient stated that she would consider it. NEELAM PATEL provided patient with information to initiate the process. Social Security Administration    You can apply online or call them at [phone].  [email or web address]    Ms. Sekou Jeffery stated that she was unsure if she would apply for health insurance through [facility]. The website and potential health insurance options were previously explored with patient on previous TC. [email or web address]      Plan of action  Patient will call NEELAM PATEL if additional support is needed.

## 2021-10-12 ENCOUNTER — PATIENT OUTREACH (OUTPATIENT)
Dept: OTHER | Age: 56
End: 2021-10-12

## 2021-10-12 NOTE — PROGRESS NOTES
Resolving current episode of case management. Patient has exceeded the allowable 6 months out of work with [employer] and was terminated as of  9/30/21. Patient was approved for long term disability and has started to receive benefits. ACM consulted August Burgos MSW who provided information on insurance options and applying for financial assistance with [employer] and [facility], also provided information for applying for permanent disability   Patient reports she is exploring options and has started applications and process for permanent disabilty and financial assistance. Deciding on insurance options. States making some slow improvement physically, still with numbness and pain in leg, trying to increase ambulation but limited due to knee pain. Advised patient would end CM episode as no longer eligible for services as no longer employed with [employer]  Or covered under the medical plan. Patient appreciative of services and has my contact number if needed. ECM will follow as needed and patient has ECM contact information for future needs.

## 2022-03-18 PROBLEM — G89.29 CHRONIC RIGHT SHOULDER PAIN: Status: ACTIVE | Noted: 2017-10-31

## 2022-03-18 PROBLEM — Z80.0 FAMILY HISTORY OF COLON CANCER: Status: ACTIVE | Noted: 2019-12-05

## 2022-03-18 PROBLEM — N95.1 MENOPAUSAL SYMPTOM: Status: ACTIVE | Noted: 2018-04-24

## 2022-03-18 PROBLEM — M54.2 NECK PAIN: Status: ACTIVE | Noted: 2020-03-31

## 2022-03-18 PROBLEM — S16.1XXA CERVICAL STRAIN: Status: ACTIVE | Noted: 2020-03-31

## 2022-03-18 PROBLEM — R07.89 OTHER CHEST PAIN: Status: ACTIVE | Noted: 2020-02-19

## 2022-03-19 PROBLEM — R20.0 NUMBNESS OF UPPER EXTREMITY: Status: ACTIVE | Noted: 2020-03-31

## 2022-03-19 PROBLEM — M54.16 LUMBAR RADICULOPATHY: Status: ACTIVE | Noted: 2021-04-21

## 2023-11-22 ENCOUNTER — TELEPHONE (OUTPATIENT)
Dept: ORTHOPEDIC SURGERY | Age: 58
End: 2023-11-22

## 2023-11-22 NOTE — TELEPHONE ENCOUNTER
Sent to 's to schedule appointment for evaluation. Per Dr Sriram Stephen ok to schedule with Estefany or Oliver Sires.  Offer patient 11/28 at 1 at our [address] [place] [address] location

## (undated) DEVICE — PREP SKN CHLRAPRP APL 26ML STR --

## (undated) DEVICE — GARMENT,MEDLINE,DVT,INT,CALF,MED, GEN2: Brand: MEDLINE

## (undated) DEVICE — CARDINAL HEALTH FLEXIBLE LIGHT HANDLE COVER: Brand: CARDINAL HEALTH

## (undated) DEVICE — POSTERIOR LAMI VANPLT-LUCAS: Brand: MEDLINE INDUSTRIES, INC.

## (undated) DEVICE — CURITY ABDOMINAL PAD: Brand: CURITY

## (undated) DEVICE — DERMABOND SKIN ADH 0.7ML -- DERMABOND ADVANCED 12/BX

## (undated) DEVICE — 2000CC GUARDIAN II: Brand: GUARDIAN

## (undated) DEVICE — PACKING 8004003 NEURAY 200PK 25X25MM: Brand: NEURAY ®

## (undated) DEVICE — 1010 S-DRAPE TOWEL DRAPE 10/BX: Brand: STERI-DRAPE™

## (undated) DEVICE — CONTAINER,SPECIMEN,O.R.STRL,4.5OZ: Brand: MEDLINE

## (undated) DEVICE — MASTISOL ADHESIVE LIQ 2/3ML

## (undated) DEVICE — 3M™ COBAN™ NL STERILE NON-LATEX SELF-ADHERENT WRAP, 2086S, 6 IN X 5 YD (15 CM X 4,5 M), 12 ROLLS/CASE: Brand: 3M™ COBAN™

## (undated) DEVICE — 3M™ COBAN™ SELF-ADHERENT WRAP, 1586S, STERILE, 6 IN X 5 YD (15 CM X 4,5 M), 12 ROLLS/CASE: Brand: 3M™ COBAN™

## (undated) DEVICE — SET IRRIG W 96IN TBNG 4 LN FLX BG

## (undated) DEVICE — DRAPE SHT 3 QTR PROXIMA 53X77 --

## (undated) DEVICE — SOFT SILICONE HYDROCELLULAR FOAM DRESSING WITH LOCK AWAY LAYER: Brand: ALLEVYN LIFE XL 21X21 CTN10

## (undated) DEVICE — PRECISION THIN (9.0 X 0.38 X 31.0MM)

## (undated) DEVICE — STRIP SKIN CLSR W1XL5IN NYL REINF CURAD

## (undated) DEVICE — Device

## (undated) DEVICE — CANNULA NSL ORAL AD FOR CAPNOFLEX CO2 O2 AIRLFE

## (undated) DEVICE — STERILE POLYISOPRENE POWDER-FREE SURGICAL GLOVES: Brand: PROTEXIS

## (undated) DEVICE — INTENDED FOR TISSUE SEPARATION, AND OTHER PROCEDURES THAT REQUIRE A SHARP SURGICAL BLADE TO PUNCTURE OR CUT.: Brand: BARD-PARKER SAFETY BLADES SIZE 15, STERILE

## (undated) DEVICE — 90-S ACCELERATOR, SUCTION PROBE, NON-BENDABLE, MAX CUT LEVEL 11: Brand: SERFAS ENERGY

## (undated) DEVICE — AGENT HEMSTAT 8ML FLX TIP MTRX + DISP SURGIFLO

## (undated) DEVICE — SUTURE FIBERWIRE SZ 2 W/ TAPERED NEEDLE BLUE L38IN NONABSORB BLU L26.5MM 1/2 CIRCLE AR7200

## (undated) DEVICE — SET IRRIG DST FLX M CONN

## (undated) DEVICE — DRAPE,U/SHT,SPLIT,FILM,60X84,STERILE: Brand: MEDLINE

## (undated) DEVICE — DRAPE,SHOULDER,BEACH CHAIR,STERILE: Brand: MEDLINE

## (undated) DEVICE — IMMOBILIZER ORTH LIGHTWEIGHT LG UNIV 9X7 IN PREMIERPRO

## (undated) DEVICE — JACKSON TABLE POSITIONER KIT: Brand: MEDLINE INDUSTRIES, INC.

## (undated) DEVICE — SHEET, T, LAPAROTOMY, STERILE: Brand: MEDLINE

## (undated) DEVICE — NEEDLE HYPO 21GA L1.5IN INTRAMUSCULAR S STL LATCH BVL UP

## (undated) DEVICE — SOLUTION IV 1000ML 0.9% SOD CHL

## (undated) DEVICE — Z DUP USE 2701075 SYSTEM SKIN CLSR 42CM DERMBND PRINEO

## (undated) DEVICE — [RESECTOR CUTTER, ARTHROSCOPIC SHAVER BLADE,  DO NOT RESTERILIZE,  DO NOT USE IF PACKAGE IS DAMAGED,  KEEP DRY,  KEEP AWAY FROM SUNLIGHT]: Brand: FORMULA

## (undated) DEVICE — BIT DRL DIA2.9MM DISP FOR SFT SUT ANCHR JUGGERKNOT

## (undated) DEVICE — (D)PREP SKN CHLRAPRP APPL 26ML -- CONVERT TO ITEM 371833

## (undated) DEVICE — SYR 10ML LUER LOK 1/5ML GRAD --

## (undated) DEVICE — SOLUTION IRRIG 3000ML 0.9% SOD CHL FLX CONT 0797208] ICU MEDICAL INC]

## (undated) DEVICE — SOL IRR SOD CL 0.9% 1000ML BTL --

## (undated) DEVICE — 3M™ IOBAN™ 2 ANTIMICROBIAL INCISE DRAPE 6650EZ: Brand: IOBAN™ 2

## (undated) DEVICE — SURGICAL PROCEDURE PACK BASIC ST FRANCIS

## (undated) DEVICE — AMD ANTIMICROBIAL GAUZE SPONGES,12 PLY USP TYPE VII, 0.2% POLYHEXAMETHYLENE BIGUANIDE HCI (PHMB): Brand: CURITY

## (undated) DEVICE — SUTURE VCRL SZ 0 L36IN ABSRB UD L36MM CT-1 1/2 CIR J946H

## (undated) DEVICE — PAD,ABDOMINAL,5"X9",STERILE,LF,1/PK: Brand: MEDLINE INDUSTRIES, INC.

## (undated) DEVICE — DRAPE MICSCP W51XL150IN FOR LEICA M680 WILD OHS

## (undated) DEVICE — SUTURE VCRL SZ 2-0 L27IN ABSRB UD L36MM CP-1 1/2 CIR REV J266H

## (undated) DEVICE — SYSTEM SKIN CLSR 22CM DERMBND PRINEO

## (undated) DEVICE — SYR 3ML LL TIP 1/10ML GRAD --

## (undated) DEVICE — SYR 50ML LR LCK 1ML GRAD NSAF --

## (undated) DEVICE — REM POLYHESIVE ADULT PATIENT RETURN ELECTRODE: Brand: VALLEYLAB

## (undated) DEVICE — SUTURE VCRL + 3-0 L27IN ABSRB UD PS-2 L19MM 3/8 CIR PRIM VCP427H

## (undated) DEVICE — [AGGRESSIVE 6-FLUTE BARREL BUR, ARTHROSCOPIC SHAVER BLADE,  DO NOT RESTERILIZE,  DO NOT USE IF PACKAGE IS DAMAGED,  KEEP DRY,  KEEP AWAY FROM SUNLIGHT]: Brand: FORMULA

## (undated) DEVICE — BONE WAX WHITE: Brand: BONE WAX WHITE

## (undated) DEVICE — RESTRAINT UNIVERSAL HEAD DISP --

## (undated) DEVICE — DRAPE XR C ARM 41X74IN LF --

## (undated) DEVICE — PLASTIC ADHESIVE BANDAGE: Brand: CURITY

## (undated) DEVICE — SUTURE VCRL SZ 1 L27IN ABSRB UD L36MM CP-1 1/2 CIR REV CUT J268H

## (undated) DEVICE — BAND RUB 1/8X2.5IN STRL --

## (undated) DEVICE — NEEDLE HYPO 18GA L1.5IN PNK S STL HUB POLYPR SHLD REG BVL

## (undated) DEVICE — 3M™ STERI-DRAPE™ INSTRUMENT POUCH 1018: Brand: STERI-DRAPE™

## (undated) DEVICE — NDL SPNE QNCKE 18GX3.5IN LF --

## (undated) DEVICE — SLIM BODY SKIN STAPLER: Brand: APPOSE ULC

## (undated) DEVICE — SUTURE MCRYL SZ 4-0 L27IN ABSRB UD L19MM PS-2 1/2 CIR PRIM Y426H

## (undated) DEVICE — INTENDED FOR TISSUE SEPARATION, AND OTHER PROCEDURES THAT REQUIRE A SHARP SURGICAL BLADE TO PUNCTURE OR CUT.: Brand: BARD-PARKER SAFETY BLADES SIZE 10, STERILE

## (undated) DEVICE — KENDALL SCD EXPRESS SLEEVES, KNEE LENGTH, MEDIUM: Brand: KENDALL SCD

## (undated) DEVICE — CONNECTOR TBNG OD5-7MM O2 END DISP

## (undated) DEVICE — SUT ETHLN 3-0 18IN PS1 BLK --

## (undated) DEVICE — C-ARM: Brand: UNBRANDED

## (undated) DEVICE — SYR 5ML 1/5 GRAD LL NSAF LF --

## (undated) DEVICE — 5.0MM PRECISION ROUND

## (undated) DEVICE — KENDALL RADIOLUCENT FOAM MONITORING ELECTRODE RECTANGULAR SHAPE: Brand: KENDALL

## (undated) DEVICE — SUTURE VCRL SZ 2-0 L27IN ABSRB UD L26MM CT-2 1/2 CIR J269H

## (undated) DEVICE — STOCKINETTE,IMPERVIOUS,12X48,STERILE: Brand: MEDLINE

## (undated) DEVICE — STOCKINETTE ORTH W9XL36IN COT 2 PLY HLLW FOR HANDLING LMB

## (undated) DEVICE — NDL PRT INJ NSAF BLNT 18GX1.5 --

## (undated) DEVICE — INTENDED FOR TISSUE SEPARATION, AND OTHER PROCEDURES THAT REQUIRE A SHARP SURGICAL BLADE TO PUNCTURE OR CUT.: Brand: BARD-PARKER SAFETY BLADES SIZE 11, STERILE

## (undated) DEVICE — SLING ARM LG 2-37INX9-20IN PCH --

## (undated) DEVICE — 3M™ TEGADERM™ TRANSPARENT FILM DRESSING FRAME STYLE, 1626W, 4 IN X 4-3/4 IN (10 CM X 12 CM), 50/CT 4CT/CASE: Brand: 3M™ TEGADERM™